# Patient Record
Sex: MALE | Race: WHITE | Employment: OTHER | URBAN - METROPOLITAN AREA
[De-identification: names, ages, dates, MRNs, and addresses within clinical notes are randomized per-mention and may not be internally consistent; named-entity substitution may affect disease eponyms.]

---

## 2018-04-25 ENCOUNTER — HOSPITAL ENCOUNTER (OUTPATIENT)
Dept: NON INVASIVE DIAGNOSTICS | Age: 78
Discharge: HOME | End: 2018-04-25
Payer: MEDICARE

## 2018-04-25 LAB
LV EF: 55 %
LVEF MODALITY: NORMAL

## 2018-04-25 PROCEDURE — 93306 TTE W/DOPPLER COMPLETE: CPT

## 2018-08-01 ENCOUNTER — APPOINTMENT (OUTPATIENT)
Dept: GENERAL RADIOLOGY | Age: 78
End: 2018-08-01

## 2018-08-01 ENCOUNTER — HOSPITAL ENCOUNTER (EMERGENCY)
Age: 78
Discharge: HOME | End: 2018-08-01
Attending: EMERGENCY MEDICINE

## 2018-08-01 VITALS
OXYGEN SATURATION: 97 % | HEART RATE: 96 BPM | TEMPERATURE: 97.5 F | BODY MASS INDEX: 19.99 KG/M2 | RESPIRATION RATE: 19 BRPM | WEIGHT: 135 LBS | HEIGHT: 69 IN | SYSTOLIC BLOOD PRESSURE: 107 MMHG | DIASTOLIC BLOOD PRESSURE: 66 MMHG

## 2018-08-01 DIAGNOSIS — S30.0XXA TRAUMATIC HEMATOMA OF BUTTOCK, INITIAL ENCOUNTER: Primary | ICD-10-CM

## 2018-08-01 PROCEDURE — 72170 X-RAY EXAM OF PELVIS: CPT

## 2018-08-01 PROCEDURE — 6370000000 HC RX 637 (ALT 250 FOR IP): Performed by: EMERGENCY MEDICINE

## 2018-08-01 PROCEDURE — 99284 EMERGENCY DEPT VISIT MOD MDM: CPT

## 2018-08-01 RX ORDER — ESOMEPRAZOLE MAGNESIUM 40 MG/1
40 FOR SUSPENSION ORAL
COMMUNITY
Start: 2018-07-23

## 2018-08-01 RX ORDER — IBUPROFEN 800 MG/1
800 TABLET ORAL ONCE
Status: COMPLETED | OUTPATIENT
Start: 2018-08-01 | End: 2018-08-01

## 2018-08-01 RX ORDER — IBUPROFEN 600 MG/1
600 TABLET ORAL EVERY 6 HOURS PRN
Qty: 30 TABLET | Refills: 0 | Status: SHIPPED | OUTPATIENT
Start: 2018-08-01

## 2018-08-01 RX ORDER — CETIRIZINE HYDROCHLORIDE 10 MG/1
1 TABLET ORAL
COMMUNITY
Start: 2016-10-03

## 2018-08-01 RX ORDER — ASPIRIN 81 MG/1
81 TABLET, CHEWABLE ORAL
COMMUNITY
Start: 2018-07-23

## 2018-08-01 RX ORDER — ACETAMINOPHEN 325 MG/1
325 TABLET ORAL EVERY 6 HOURS PRN
Qty: 60 TABLET | Refills: 0 | Status: SHIPPED | OUTPATIENT
Start: 2018-08-01

## 2018-08-01 RX ORDER — TRAZODONE HYDROCHLORIDE 100 MG/1
TABLET ORAL
COMMUNITY
Start: 2017-08-10

## 2018-08-01 RX ORDER — ALBUTEROL SULFATE 90 UG/1
2 AEROSOL, METERED RESPIRATORY (INHALATION)
COMMUNITY

## 2018-08-01 RX ORDER — FLUTICASONE PROPIONATE 50 MCG
SPRAY, SUSPENSION (ML) NASAL
COMMUNITY
Start: 2017-07-10

## 2018-08-01 RX ORDER — ACETAMINOPHEN 500 MG
1000 TABLET ORAL ONCE
Status: COMPLETED | OUTPATIENT
Start: 2018-08-01 | End: 2018-08-01

## 2018-08-01 RX ORDER — ROSUVASTATIN CALCIUM 5 MG/1
5 TABLET, COATED ORAL
COMMUNITY
Start: 2017-07-10

## 2018-08-01 RX ADMIN — ACETAMINOPHEN 1000 MG: 500 TABLET ORAL at 15:32

## 2018-08-01 RX ADMIN — IBUPROFEN 800 MG: 800 TABLET, FILM COATED ORAL at 15:32

## 2018-08-01 ASSESSMENT — ENCOUNTER SYMPTOMS
ABDOMINAL PAIN: 0
SHORTNESS OF BREATH: 0

## 2018-08-01 ASSESSMENT — PAIN DESCRIPTION - FREQUENCY: FREQUENCY: CONTINUOUS

## 2018-08-01 ASSESSMENT — PAIN DESCRIPTION - LOCATION: LOCATION: HIP

## 2018-08-01 ASSESSMENT — PAIN DESCRIPTION - PAIN TYPE: TYPE: ACUTE PAIN

## 2018-08-01 ASSESSMENT — PAIN SCALES - GENERAL
PAINLEVEL_OUTOF10: 6
PAINLEVEL_OUTOF10: 6

## 2018-08-01 ASSESSMENT — PAIN DESCRIPTION - PROGRESSION: CLINICAL_PROGRESSION: GRADUALLY WORSENING

## 2018-08-01 ASSESSMENT — PAIN DESCRIPTION - ORIENTATION: ORIENTATION: RIGHT

## 2018-08-01 ASSESSMENT — PAIN DESCRIPTION - DESCRIPTORS: DESCRIPTORS: DISCOMFORT

## 2018-08-01 ASSESSMENT — PAIN DESCRIPTION - ONSET: ONSET: SUDDEN

## 2018-08-01 NOTE — ED NOTES
Valentina Andersen is a 68 y/o male who presents to the emergency department s/p mechanical fall with right gluteal pain that does not radiate and is not made worse with palpation or movement. Patient states that he was walking on flat ground when he fell backwards. Patient does not claim to have had palpitations, light headedness, dizziness, or any change in subjective feeling before the fall. Patient states that he did not lose consciousness and did not hit his head. Patient does not claim that he has pain in any other location. ROS is negative for chest pain, shortness of breath, light headedness, lower extremity weakness or pain. Objective:   Constitutional: patient is lying comfortably on exam table   Head: no lac, bruises, or bumps noted   CV: Normal rate and rhythm  Pulm: Breath sounds equal bilaterally   Musculoskeletal: Patient has 5/5 strength in lower and upper extremities   Derm: Patient is noted to have a hematoma in the right gluteal area.

## 2018-08-01 NOTE — ED PROVIDER NOTES
[facility] ED  Emergency Department Encounter  Emergency Medicine Resident     Pt Name: Dasha Mcarthur  MRN: [id number]  [place] 1940  Date of evaluation: 8/1/18  PCP:  No primary care provider on file. CHIEF COMPLAINT       Chief Complaint   Patient presents with    Fall     pt to ED c/o right hip pain s/p fall this morning. pt denies loc or hitting his head. pt a&o x4, answering questions appropriately. HISTORY OF PRESENT ILLNESS  (Location/Symptom, Timing/Onset, Context/Setting, Quality, Duration, Modifying Factors, Severity, Associated signs/symptoms)     Davy Floyd is a 68 y/o male who presents to the emergency department s/p mechanical fall with right gluteal pain that does not radiate and is not made worse with palpation or movement. Patient states that he was walking on flat ground when he fell backwards. Patient does not claim to have had palpitations, light headedness, dizziness, or any change in subjective feeling before the fall. Patient states that he did not lose consciousness and did not hit his head. Patient does not claim that he has pain in any other location. Is not on any blood thinners daily. Denies any pain elsewhere. Denies any changes in his gait. Denies any focal neurological deficits. (History was documented by medical student and reviewed by resident who is agreeable. Modifications and/or additions may have been made by resident appropriately.)    PAST MEDICAL / SURGICAL / SOCIAL / FAMILY HISTORY      has no past medical history on file. has no past surgical history on file. Social History     Social History    Marital status: Single     Spouse name: N/A    Number of children: N/A    Years of education: N/A     Occupational History    Not on file.      Social History Main Topics    Smoking status: Not on file    Smokeless tobacco: Not on file    Alcohol use Not on file    Drug use: Unknown    Sexual activity: Not on file     Other Topics Concern    Not on file     Social History Narrative    No narrative on file       History reviewed. No pertinent family history. Allergies:  Patient has no known allergies. Home Medications:  Prior to Admission medications    Medication Sig Start Date End Date Taking? Authorizing Provider   aspirin 81 MG chewable tablet Take 81 mg by mouth 7/23/18  Yes Historical Provider, MD   cetirizine (ZYRTEC) 10 MG tablet Take 1 tablet by mouth 10/3/16  Yes Historical Provider, MD   esomeprazole Magnesium (NEXIUM) 40 MG PACK Take 40 mg by mouth 7/23/18  Yes Historical Provider, MD   traZODone (DESYREL) 100 MG tablet TAKE 1 TABLET BY MOUTH AT BEDTIME 8/10/17  Yes Historical Provider, MD   rosuvastatin (CRESTOR) 5 MG tablet Take 5 mg by mouth 7/10/17  Yes Historical Provider, MD   fluticasone (FLONASE) 50 MCG/ACT nasal spray ADMINISTER 2 SPRAYS INTO EACH NOSTRIL DAILY. 7/10/17  Yes Historical Provider, MD   acetaminophen (TYLENOL) 325 MG tablet Take 1 tablet by mouth every 6 hours as needed for Pain 8/1/18  Yes Rachel Brewster MD   ibuprofen (ADVIL;MOTRIN) 600 MG tablet Take 1 tablet by mouth every 6 hours as needed for Pain 8/1/18  Yes Rachel Brewster MD   albuterol sulfate  (90 Base) MCG/ACT inhaler Inhale 2 puffs into the lungs    Historical Provider, MD       REVIEW OF SYSTEMS    (2-9 systems for level 4, 10 or more for level 5)      Review of Systems   Constitutional: Negative for chills and fever. Eyes: Negative for visual disturbance. Respiratory: Negative for shortness of breath. Cardiovascular: Negative for chest pain. Gastrointestinal: Negative for abdominal pain. Musculoskeletal: Negative for arthralgias. +right buttock pain   Skin: Negative for wound. Neurological: Negative for dizziness, light-headedness and headaches.      PHYSICAL EXAM   (up to 7 for level 4, 8 or more for level 5)      INITIAL VITALS:   /66   Pulse 96   Temp 97.5 °F (36.4 °C) (Oral)   Resp 19

## 2020-07-15 ENCOUNTER — APPOINTMENT (OUTPATIENT)
Dept: GENERAL RADIOLOGY | Age: 80
End: 2020-07-15
Payer: MEDICARE

## 2020-07-15 ENCOUNTER — HOSPITAL ENCOUNTER (EMERGENCY)
Age: 80
Discharge: HOME | End: 2020-07-15
Attending: EMERGENCY MEDICINE
Payer: MEDICARE

## 2020-07-15 VITALS
TEMPERATURE: 98.6 F | OXYGEN SATURATION: 97 % | WEIGHT: 165 LBS | DIASTOLIC BLOOD PRESSURE: 73 MMHG | HEART RATE: 80 BPM | HEIGHT: 67 IN | RESPIRATION RATE: 16 BRPM | SYSTOLIC BLOOD PRESSURE: 123 MMHG | BODY MASS INDEX: 25.9 KG/M2

## 2020-07-15 PROCEDURE — 73521 X-RAY EXAM HIPS BI 2 VIEWS: CPT

## 2020-07-15 PROCEDURE — 99283 EMERGENCY DEPT VISIT LOW MDM: CPT

## 2020-07-15 PROCEDURE — 6370000000 HC RX 637 (ALT 250 FOR IP): Performed by: EMERGENCY MEDICINE

## 2020-07-15 RX ORDER — ACETAMINOPHEN 500 MG
1000 TABLET ORAL ONCE
Status: COMPLETED | OUTPATIENT
Start: 2020-07-15 | End: 2020-07-15

## 2020-07-15 RX ORDER — NAPROXEN 500 MG/1
500 TABLET ORAL 2 TIMES DAILY
Qty: 20 TABLET | Refills: 0 | Status: SHIPPED | OUTPATIENT
Start: 2020-07-15

## 2020-07-15 RX ADMIN — ACETAMINOPHEN 1000 MG: 500 TABLET ORAL at 12:01

## 2020-07-15 ASSESSMENT — ENCOUNTER SYMPTOMS
TROUBLE SWALLOWING: 0
BLOOD IN STOOL: 0
DIARRHEA: 0
SORE THROAT: 0
WHEEZING: 0
VOMITING: 0
NAUSEA: 0
COUGH: 0
VOICE CHANGE: 0
SHORTNESS OF BREATH: 0

## 2020-07-15 ASSESSMENT — PAIN SCALES - GENERAL
PAINLEVEL_OUTOF10: 6
PAINLEVEL_OUTOF10: 6

## 2020-07-15 ASSESSMENT — PAIN DESCRIPTION - PAIN TYPE: TYPE: ACUTE PAIN

## 2020-07-15 ASSESSMENT — PAIN DESCRIPTION - ORIENTATION: ORIENTATION: RIGHT

## 2020-07-15 ASSESSMENT — PAIN DESCRIPTION - FREQUENCY: FREQUENCY: CONTINUOUS

## 2020-07-15 ASSESSMENT — PAIN DESCRIPTION - DESCRIPTORS: DESCRIPTORS: SHOOTING

## 2020-07-15 ASSESSMENT — PAIN DESCRIPTION - LOCATION: LOCATION: HIP

## 2020-07-15 NOTE — ED NOTES
Patient to ed with right hip pain that does radiate down right leg. Patient states it started while walking this am. Patient denies hx of this pain. Patient denies injury. Patient denies hx of sciatica. Patient denies hx of hip problem. Patient denies taking anything for pain. Patient states he can bear some weight but it hurts very badly.       Mdadi Kaiser RN  07/15/20 8323

## 2020-07-15 NOTE — ED NOTES
Bed: 07  Expected date:   Expected time:   Means of arrival:   Comments:     Brisa Tinajero RN  07/15/20 1024

## 2020-07-15 NOTE — ED NOTES
SW set up patient's [address] with [employer]. Patient waiting in ED Deanne. Donavan Sotomayor.  Richard Hammans  07/15/20 4510

## 2020-07-15 NOTE — ED PROVIDER NOTES
[facility]  ED  eMERGENCY dEPARTMENT eNCOUnter      Pt Name: Bianka Bolaños  MRN: [id number]  [place] 1940  Date of evaluation: 7/15/2020  Provider: Nilo Carson MD    [address] [place] [address]     Chief Complaint   Patient presents with    Hip Pain     right hip pain, started when walking, shooting pain, does go down right leg, no hx of sciatica. no hx of this pain, no injury         HISTORY OF PRESENT ILLNESS   (Location/Symptom, Timing/Onset, Context/Setting,Quality, Duration, Modifying Factors, Severity)  Note limiting factors. Bianka Bolaños is a67 y.o. male who presents to the emergency department      HPI    80-year-old male states he was walking today when he developed sudden onset right hip pain. He states he did not fall however has had trouble walking since. He denies prior fractures, denies chronic hip pain. No other concerning symptoms. Denies fevers or chills, no other trauma. Nursing Notes werereviewed. REVIEW OF SYSTEMS    (2-9 systems for level 4, 10 or more for level 5)     Review of Systems   Constitutional: Negative for chills, fatigue and fever. HENT: Negative for sore throat, trouble swallowing and voice change. Eyes: Negative for visual disturbance. Respiratory: Negative for cough, shortness of breath and wheezing. Cardiovascular: Negative for chest pain. Gastrointestinal: Negative for blood in stool, diarrhea, nausea and vomiting. Genitourinary: Negative for difficulty urinating and flank pain. Skin: Negative for rash. Neurological: Negative for weakness and numbness. Psychiatric/Behavioral: Negative for agitation. Except as noted above the remainder of the review of systems was reviewed and negative. PAST MEDICAL HISTORY   History reviewed. No pertinent past medical history. SURGICALHISTORY     History reviewed. No pertinent surgical history.       CURRENT MEDICATIONS       Previous Medications    ACETAMINOPHEN (TYLENOL) 325 MG TABLET    Take 1 tablet by mouth every 6 hours as needed for Pain    ALBUTEROL SULFATE  (90 BASE) MCG/ACT INHALER    Inhale 2 puffs into the lungs    ASPIRIN 81 MG CHEWABLE TABLET    Take 81 mg by mouth    CETIRIZINE (ZYRTEC) 10 MG TABLET    Take 1 tablet by mouth    ESOMEPRAZOLE MAGNESIUM (NEXIUM) 40 MG PACK    Take 40 mg by mouth    FLUTICASONE (FLONASE) 50 MCG/ACT NASAL SPRAY    ADMINISTER 2 SPRAYS INTO EACH NOSTRIL DAILY. IBUPROFEN (ADVIL;MOTRIN) 600 MG TABLET    Take 1 tablet by mouth every 6 hours as needed for Pain    ROSUVASTATIN (CRESTOR) 5 MG TABLET    Take 5 mg by mouth    TRAZODONE (DESYREL) 100 MG TABLET    TAKE 1 TABLET BY MOUTH AT BEDTIME       ALLERGIES     Patient has no known allergies. FAMILY HISTORY     History reviewed. No pertinent family history.        SOCIAL HISTORY       Social History     Socioeconomic History    Marital status: Single     Spouse name: None    Number of children: None    Years of education: None    Highest education level: None   Occupational History    None   Social Needs    Financial resource strain: None    Food insecurity     Worry: None     Inability: None    Transportation needs     Medical: None     Non-medical: None   Tobacco Use    Smoking status: None   Substance and Sexual Activity    Alcohol use: None    Drug use: None    Sexual activity: None   Lifestyle    Physical activity     Days per week: None     Minutes per session: None    Stress: None   Relationships    Social connections     Talks on phone: None     Gets together: None     Attends [employer] service: None     Active member of club or organization: None     Attends meetings of clubs or organizations: None     Relationship status: None    Intimate partner violence     Fear of current or ex partner: None     Emotionally abused: None     Physically abused: None     Forced sexual activity: None   Other Topics Concern    None   Social History Narrative    None       SCREENINGS range or not returned as of this dictation. EMERGENCY DEPARTMENT COURSE and DIFFERENTIAL DIAGNOSIS/MDM:   Vitals:    Vitals:    07/15/20 1027   BP: 123/73   Pulse: 80   Resp: 16   Temp: 98.6 °F (37 °C)   TempSrc: Oral   SpO2: 97%   Weight: 165 lb (74.8 kg)   Height: 5' 7\" (1.702 m)         MDM  Number of Diagnoses or Management Options  Diagnosis management comments: 61-year-old with isolated right hip pain, states started today. Exam reassuring, patient is very hard of hearing however denies any other trauma or concerning symptoms. Give Tylenol, get x-ray of the pelvis and hip, reassess. 12:58 PM EDT  X-ray reassuring except for arthritis, patient is able to stand, stand on both legs individually and raise opposite leg up. He has no restriction of motion, no reproducible tenderness over the joint. He lives by himself however states he does not wish for a cane or any walking assistance. He has no issues walking has not fallen at home. We will discharge him with Naprosyn, PCP follow-up, and strict return precautions            Procedures    FINAL IMPRESSION      1. Right hip pain        DISPOSITION/PLAN   DISPOSITION Decision To Discharge 07/15/2020 01:00:19 PM      PATIENT REFERRED TO:  Dr Yenni Bowman  Your Primary Care Doctor  In 1 day  As needed, If symptoms worsen      DISCHARGE MEDICATIONS:  New Prescriptions    NAPROXEN (NAPROSYN) 500 MG TABLET    Take 1 tablet by mouth 2 times daily              Summation      Patient Course:      ED Medications administered this visit:    Medications   acetaminophen (TYLENOL) tablet 1,000 mg (1,000 mg Oral Given 7/15/20 1201)       New Prescriptions from this visit:    New Prescriptions    NAPROXEN (NAPROSYN) 500 MG TABLET    Take 1 tablet by mouth 2 times daily       Follow-up:  Dr Yenni Bowman  Your Primary Care Doctor  In 1 day  As needed, If symptoms worsen        Final Impression:   1.  Right hip pain               (Please note that portions of this note were completed with a voice recognition program.  Efforts were made to edit the dictations but occasionally words are mis-transcribed.)            Chayito Rock MD  07/15/20 2591

## 2020-07-15 NOTE — FLOWSHEET NOTE
Assessment: Patient was awake when  visited. Family was not present. Patient was a bit passive but open to spiritual care. When asked how he was feeling, patient smiled. Intervention:  offered support and prayed with patient. Outcome: Patient thanked  for praying. Follow up visits recommended for more prayers and support.        07/15/20 1242   Encounter Summary   Services provided to: Patient   Continue Visiting   (07/15/2020)   Complexity of Encounter Moderate   Length of Encounter 15 minutes   Spiritual Assessment Completed Yes   Routine   Type Initial   Assessment Approachable;Passive   Intervention Active listening;Prayer;[place];Empowerment   Outcome Expressed gratitude   Spiritual/Temple   Type Spiritual support

## 2020-07-16 ENCOUNTER — HOSPITAL ENCOUNTER (OUTPATIENT)
Age: 80
Setting detail: SPECIMEN
Discharge: HOME | End: 2020-07-16
Payer: MEDICARE

## 2020-07-22 LAB — SARS-COV-2, NAA: NOT DETECTED

## 2020-07-23 ENCOUNTER — TELEPHONE (OUTPATIENT)
Dept: FAMILY MEDICINE CLINIC | Age: 80
End: 2020-07-23

## 2021-01-01 ENCOUNTER — APPOINTMENT (OUTPATIENT)
Dept: GENERAL RADIOLOGY | Age: 81
DRG: 871 | End: 2021-01-01
Payer: MEDICARE

## 2021-01-01 ENCOUNTER — APPOINTMENT (OUTPATIENT)
Dept: CT IMAGING | Age: 81
DRG: 871 | End: 2021-01-01
Payer: MEDICARE

## 2021-01-01 ENCOUNTER — HOSPITAL ENCOUNTER (INPATIENT)
Age: 81
LOS: 1 days | DRG: 871 | End: 2021-11-27
Attending: EMERGENCY MEDICINE | Admitting: EMERGENCY MEDICINE
Payer: MEDICARE

## 2021-01-01 VITALS
TEMPERATURE: 91 F | HEART RATE: 67 BPM | SYSTOLIC BLOOD PRESSURE: 58 MMHG | OXYGEN SATURATION: 96 % | RESPIRATION RATE: 23 BRPM | DIASTOLIC BLOOD PRESSURE: 36 MMHG

## 2021-01-01 DIAGNOSIS — U07.1 COVID: Primary | ICD-10-CM

## 2021-01-01 DIAGNOSIS — U07.1 ACUTE RESPIRATORY FAILURE DUE TO COVID-19 (HCC): ICD-10-CM

## 2021-01-01 DIAGNOSIS — A41.9 SEPTIC SHOCK (HCC): ICD-10-CM

## 2021-01-01 DIAGNOSIS — J96.00 ACUTE RESPIRATORY FAILURE DUE TO COVID-19 (HCC): ICD-10-CM

## 2021-01-01 DIAGNOSIS — M62.82 NON-TRAUMATIC RHABDOMYOLYSIS: ICD-10-CM

## 2021-01-01 DIAGNOSIS — I46.9 CARDIAC ARREST (HCC): ICD-10-CM

## 2021-01-01 DIAGNOSIS — U07.1 SEPSIS DUE TO COVID-19 (HCC): ICD-10-CM

## 2021-01-01 DIAGNOSIS — R65.21 SEPTIC SHOCK (HCC): ICD-10-CM

## 2021-01-01 DIAGNOSIS — A41.89 SEPSIS DUE TO COVID-19 (HCC): ICD-10-CM

## 2021-01-01 LAB
ABSOLUTE EOS #: 0 K/UL (ref 0–0.4)
ABSOLUTE EOS #: 0 K/UL (ref 0–0.4)
ABSOLUTE IMMATURE GRANULOCYTE: 0 K/UL (ref 0–0.3)
ABSOLUTE IMMATURE GRANULOCYTE: 0.62 K/UL (ref 0–0.3)
ABSOLUTE LYMPH #: 0.4 K/UL (ref 1–4.8)
ABSOLUTE LYMPH #: 0.62 K/UL (ref 1–4.8)
ABSOLUTE MONO #: 0.6 K/UL (ref 0.1–0.8)
ABSOLUTE MONO #: 1.24 K/UL (ref 0.1–0.8)
ACETAMINOPHEN LEVEL: <5 UG/ML (ref 10–30)
ALBUMIN SERPL-MCNC: 3.1 G/DL (ref 3.5–5.2)
ALBUMIN/GLOBULIN RATIO: 0.9 (ref 1–2.5)
ALLEN TEST: ABNORMAL
ALP BLD-CCNC: 113 U/L (ref 40–129)
ALT SERPL-CCNC: 57 U/L (ref 5–41)
AMMONIA: 22 UMOL/L (ref 16–60)
ANION GAP SERPL CALCULATED.3IONS-SCNC: 23 MMOL/L (ref 9–17)
ANION GAP SERPL CALCULATED.3IONS-SCNC: 28 MMOL/L (ref 9–17)
ANION GAP: 4 MMOL/L (ref 7–16)
AST SERPL-CCNC: 103 U/L
BASOPHILS # BLD: 0 % (ref 0–2)
BASOPHILS # BLD: 0 % (ref 0–2)
BASOPHILS ABSOLUTE: 0 K/UL (ref 0–0.2)
BASOPHILS ABSOLUTE: 0 K/UL (ref 0–0.2)
BILIRUB SERPL-MCNC: 3.95 MG/DL (ref 0.3–1.2)
BUN BLDV-MCNC: 86 MG/DL (ref 8–23)
BUN BLDV-MCNC: 88 MG/DL (ref 8–23)
BUN/CREAT BLD: ABNORMAL (ref 9–20)
BUN/CREAT BLD: ABNORMAL (ref 9–20)
CALCIUM SERPL-MCNC: 10.6 MG/DL (ref 8.6–10.4)
CALCIUM SERPL-MCNC: 8.9 MG/DL (ref 8.6–10.4)
CHLORIDE BLD-SCNC: 110 MMOL/L (ref 98–107)
CHLORIDE BLD-SCNC: 113 MMOL/L (ref 98–107)
CO2: 13 MMOL/L (ref 20–31)
CO2: 9 MMOL/L (ref 20–31)
CREAT SERPL-MCNC: 3.83 MG/DL (ref 0.7–1.2)
CREAT SERPL-MCNC: 3.9 MG/DL (ref 0.7–1.2)
DIFFERENTIAL TYPE: ABNORMAL
DIFFERENTIAL TYPE: ABNORMAL
EOSINOPHILS RELATIVE PERCENT: 0 % (ref 1–4)
EOSINOPHILS RELATIVE PERCENT: 0 % (ref 1–4)
ETHANOL PERCENT: <0.01 %
ETHANOL: <10 MG/DL
FIO2: ABNORMAL
GFR AFRICAN AMERICAN: 18 ML/MIN
GFR AFRICAN AMERICAN: 19 ML/MIN
GFR NON-AFRICAN AMERICAN: 11 ML/MIN
GFR NON-AFRICAN AMERICAN: 15 ML/MIN
GFR NON-AFRICAN AMERICAN: 15 ML/MIN
GFR SERPL CREATININE-BSD FRML MDRD: 13 ML/MIN
GFR SERPL CREATININE-BSD FRML MDRD: ABNORMAL ML/MIN/{1.73_M2}
GLUCOSE BLD-MCNC: 115 MG/DL (ref 74–100)
GLUCOSE BLD-MCNC: 138 MG/DL (ref 70–99)
GLUCOSE BLD-MCNC: 96 MG/DL (ref 70–99)
HCO3 VENOUS: 15 MMOL/L (ref 22–29)
HCT VFR BLD CALC: 50.4 % (ref 40.7–50.3)
HCT VFR BLD CALC: 58.3 % (ref 40.7–50.3)
HEMOGLOBIN: 16.1 G/DL (ref 13–17)
HEMOGLOBIN: 19.6 G/DL (ref 13–17)
IMMATURE GRANULOCYTES: 0 %
IMMATURE GRANULOCYTES: 3 %
LACTIC ACID, SEPSIS WHOLE BLOOD: 8.5 MMOL/L (ref 0.5–1.9)
LACTIC ACID, SEPSIS WHOLE BLOOD: 9.3 MMOL/L (ref 0.5–1.9)
LACTIC ACID, SEPSIS: ABNORMAL MMOL/L (ref 0.5–1.9)
LACTIC ACID, SEPSIS: ABNORMAL MMOL/L (ref 0.5–1.9)
LIPASE: 38 U/L (ref 13–60)
LYMPHOCYTES # BLD: 2 % (ref 24–44)
LYMPHOCYTES # BLD: 3 % (ref 24–44)
MAGNESIUM: 2.9 MG/DL (ref 1.6–2.6)
MAGNESIUM: 3.2 MG/DL (ref 1.6–2.6)
MCH RBC QN AUTO: 31.5 PG (ref 25.2–33.5)
MCH RBC QN AUTO: 32.1 PG (ref 25.2–33.5)
MCHC RBC AUTO-ENTMCNC: 31.9 G/DL (ref 28.4–34.8)
MCHC RBC AUTO-ENTMCNC: 33.6 G/DL (ref 28.4–34.8)
MCV RBC AUTO: 95.4 FL (ref 82.6–102.9)
MCV RBC AUTO: 98.6 FL (ref 82.6–102.9)
MODE: ABNORMAL
MONOCYTES # BLD: 3 % (ref 1–7)
MONOCYTES # BLD: 6 % (ref 1–7)
MORPHOLOGY: NORMAL
MORPHOLOGY: NORMAL
MYOGLOBIN: 3010 NG/ML (ref 28–72)
NEGATIVE BASE EXCESS, VEN: 16 (ref 0–2)
NRBC AUTOMATED: 0.4 PER 100 WBC
NRBC AUTOMATED: 0.8 PER 100 WBC
NUCLEATED RED BLOOD CELLS: 1 PER 100 WBC
O2 DEVICE/FLOW/%: ABNORMAL
O2 SAT, VEN: 25 % (ref 60–85)
PATIENT TEMP: ABNORMAL
PCO2, VEN: 51.4 MM HG (ref 41–51)
PDW BLD-RTO: 13.5 % (ref 11.8–14.4)
PDW BLD-RTO: 13.6 % (ref 11.8–14.4)
PH VENOUS: 7.07 (ref 7.32–7.43)
PLATELET # BLD: ABNORMAL K/UL (ref 138–453)
PLATELET # BLD: ABNORMAL K/UL (ref 138–453)
PLATELET ESTIMATE: ABNORMAL
PLATELET ESTIMATE: ABNORMAL
PLATELET, FLUORESCENCE: 40 K/UL (ref 138–453)
PLATELET, FLUORESCENCE: 55 K/UL (ref 138–453)
PLATELET, IMMATURE FRACTION: 11.8 % (ref 1.1–10.3)
PLATELET, IMMATURE FRACTION: 13.7 % (ref 1.1–10.3)
PMV BLD AUTO: ABNORMAL FL (ref 8.1–13.5)
PMV BLD AUTO: ABNORMAL FL (ref 8.1–13.5)
PO2, VEN: 24.2 MM HG (ref 30–50)
POC BUN: 107 MG/DL (ref 8–26)
POC CHLORIDE: 128 MMOL/L (ref 98–107)
POC CREATININE: 5.08 MG/DL (ref 0.51–1.19)
POC HEMATOCRIT: 59 % (ref 41–53)
POC HEMOGLOBIN: 20.1 G/DL (ref 13.5–17.5)
POC IONIZED CALCIUM: 1.02 MMOL/L (ref 1.15–1.33)
POC LACTIC ACID: 10.45 MMOL/L (ref 0.56–1.39)
POC PCO2 TEMP: ABNORMAL MM HG
POC PH TEMP: ABNORMAL
POC PO2 TEMP: ABNORMAL MM HG
POC POTASSIUM: 8 MMOL/L (ref 3.5–5.1)
POC SODIUM: 148 MMOL/L (ref 138–146)
POC TCO2: 17 MMOL/L (ref 22–30)
POSITIVE BASE EXCESS, VEN: ABNORMAL (ref 0–3)
POTASSIUM SERPL-SCNC: 4.4 MMOL/L (ref 3.7–5.3)
POTASSIUM SERPL-SCNC: 5.3 MMOL/L (ref 3.7–5.3)
RBC # BLD: 5.11 M/UL (ref 4.21–5.77)
RBC # BLD: 6.11 M/UL (ref 4.21–5.77)
RBC # BLD: ABNORMAL 10*6/UL
RBC # BLD: ABNORMAL 10*6/UL
SALICYLATE LEVEL: <1 MG/DL (ref 3–10)
SAMPLE SITE: ABNORMAL
SARS-COV-2, RAPID: DETECTED
SEG NEUTROPHILS: 88 % (ref 36–66)
SEG NEUTROPHILS: 95 % (ref 36–66)
SEGMENTED NEUTROPHILS ABSOLUTE COUNT: 18.12 K/UL (ref 1.8–7.7)
SEGMENTED NEUTROPHILS ABSOLUTE COUNT: 19 K/UL (ref 1.8–7.7)
SODIUM BLD-SCNC: 147 MMOL/L (ref 135–144)
SODIUM BLD-SCNC: 149 MMOL/L (ref 135–144)
SPECIMEN DESCRIPTION: ABNORMAL
TOTAL CK: 984 U/L (ref 39–308)
TOTAL CO2, VENOUS: ABNORMAL MMOL/L (ref 23–30)
TOTAL PROTEIN: 6.4 G/DL (ref 6.4–8.3)
TOXIC TRICYCLIC SC,BLOOD: NEGATIVE
TROPONIN INTERP: ABNORMAL
TROPONIN T: ABNORMAL NG/ML
TROPONIN, HIGH SENSITIVITY: 60 NG/L (ref 0–22)
TSH SERPL DL<=0.05 MIU/L-ACNC: 1.17 MIU/L (ref 0.3–5)
WBC # BLD: 20 K/UL (ref 3.5–11.3)
WBC # BLD: 20.6 K/UL (ref 3.5–11.3)
WBC # BLD: ABNORMAL 10*3/UL
WBC # BLD: ABNORMAL 10*3/UL

## 2021-01-01 PROCEDURE — 83690 ASSAY OF LIPASE: CPT

## 2021-01-01 PROCEDURE — 80179 DRUG ASSAY SALICYLATE: CPT

## 2021-01-01 PROCEDURE — 06HY33Z INSERTION OF INFUSION DEVICE INTO LOWER VEIN, PERCUTANEOUS APPROACH: ICD-10-PCS | Performed by: EMERGENCY MEDICINE

## 2021-01-01 PROCEDURE — 82140 ASSAY OF AMMONIA: CPT

## 2021-01-01 PROCEDURE — 1200000000 HC SEMI PRIVATE

## 2021-01-01 PROCEDURE — 6360000002 HC RX W HCPCS: Performed by: STUDENT IN AN ORGANIZED HEALTH CARE EDUCATION/TRAINING PROGRAM

## 2021-01-01 PROCEDURE — 2580000003 HC RX 258: Performed by: EMERGENCY MEDICINE

## 2021-01-01 PROCEDURE — 6360000002 HC RX W HCPCS

## 2021-01-01 PROCEDURE — 6370000000 HC RX 637 (ALT 250 FOR IP): Performed by: STUDENT IN AN ORGANIZED HEALTH CARE EDUCATION/TRAINING PROGRAM

## 2021-01-01 PROCEDURE — G0480 DRUG TEST DEF 1-7 CLASSES: HCPCS

## 2021-01-01 PROCEDURE — 84443 ASSAY THYROID STIM HORMONE: CPT

## 2021-01-01 PROCEDURE — 96365 THER/PROPH/DIAG IV INF INIT: CPT

## 2021-01-01 PROCEDURE — 83874 ASSAY OF MYOGLOBIN: CPT

## 2021-01-01 PROCEDURE — 83605 ASSAY OF LACTIC ACID: CPT

## 2021-01-01 PROCEDURE — 85055 RETICULATED PLATELET ASSAY: CPT

## 2021-01-01 PROCEDURE — 2500000003 HC RX 250 WO HCPCS

## 2021-01-01 PROCEDURE — 6360000002 HC RX W HCPCS: Performed by: EMERGENCY MEDICINE

## 2021-01-01 PROCEDURE — 80143 DRUG ASSAY ACETAMINOPHEN: CPT

## 2021-01-01 PROCEDURE — 31500 INSERT EMERGENCY AIRWAY: CPT

## 2021-01-01 PROCEDURE — 99285 EMERGENCY DEPT VISIT HI MDM: CPT

## 2021-01-01 PROCEDURE — 84520 ASSAY OF UREA NITROGEN: CPT

## 2021-01-01 PROCEDURE — 71045 X-RAY EXAM CHEST 1 VIEW: CPT

## 2021-01-01 PROCEDURE — 84484 ASSAY OF TROPONIN QUANT: CPT

## 2021-01-01 PROCEDURE — 82565 ASSAY OF CREATININE: CPT

## 2021-01-01 PROCEDURE — 87040 BLOOD CULTURE FOR BACTERIA: CPT

## 2021-01-01 PROCEDURE — 82330 ASSAY OF CALCIUM: CPT

## 2021-01-01 PROCEDURE — 80053 COMPREHEN METABOLIC PANEL: CPT

## 2021-01-01 PROCEDURE — 5A12012 PERFORMANCE OF CARDIAC OUTPUT, SINGLE, MANUAL: ICD-10-PCS | Performed by: EMERGENCY MEDICINE

## 2021-01-01 PROCEDURE — 85025 COMPLETE CBC W/AUTO DIFF WBC: CPT

## 2021-01-01 PROCEDURE — 85014 HEMATOCRIT: CPT

## 2021-01-01 PROCEDURE — 83735 ASSAY OF MAGNESIUM: CPT

## 2021-01-01 PROCEDURE — 80051 ELECTROLYTE PANEL: CPT

## 2021-01-01 PROCEDURE — 93005 ELECTROCARDIOGRAM TRACING: CPT | Performed by: STUDENT IN AN ORGANIZED HEALTH CARE EDUCATION/TRAINING PROGRAM

## 2021-01-01 PROCEDURE — 5A1935Z RESPIRATORY VENTILATION, LESS THAN 24 CONSECUTIVE HOURS: ICD-10-PCS | Performed by: EMERGENCY MEDICINE

## 2021-01-01 PROCEDURE — 80048 BASIC METABOLIC PNL TOTAL CA: CPT

## 2021-01-01 PROCEDURE — 04HY32Z INSERTION OF MONITORING DEVICE INTO LOWER ARTERY, PERCUTANEOUS APPROACH: ICD-10-PCS | Performed by: EMERGENCY MEDICINE

## 2021-01-01 PROCEDURE — 72125 CT NECK SPINE W/O DYE: CPT

## 2021-01-01 PROCEDURE — 2500000003 HC RX 250 WO HCPCS: Performed by: STUDENT IN AN ORGANIZED HEALTH CARE EDUCATION/TRAINING PROGRAM

## 2021-01-01 PROCEDURE — 70450 CT HEAD/BRAIN W/O DYE: CPT

## 2021-01-01 PROCEDURE — 2580000003 HC RX 258: Performed by: STUDENT IN AN ORGANIZED HEALTH CARE EDUCATION/TRAINING PROGRAM

## 2021-01-01 PROCEDURE — 92950 HEART/LUNG RESUSCITATION CPR: CPT

## 2021-01-01 PROCEDURE — 82550 ASSAY OF CK (CPK): CPT

## 2021-01-01 PROCEDURE — 96375 TX/PRO/DX INJ NEW DRUG ADDON: CPT

## 2021-01-01 PROCEDURE — 87635 SARS-COV-2 COVID-19 AMP PRB: CPT

## 2021-01-01 PROCEDURE — 96361 HYDRATE IV INFUSION ADD-ON: CPT

## 2021-01-01 PROCEDURE — 82947 ASSAY GLUCOSE BLOOD QUANT: CPT

## 2021-01-01 PROCEDURE — 2500000003 HC RX 250 WO HCPCS: Performed by: EMERGENCY MEDICINE

## 2021-01-01 PROCEDURE — 82803 BLOOD GASES ANY COMBINATION: CPT

## 2021-01-01 PROCEDURE — 93010 ELECTROCARDIOGRAM REPORT: CPT | Performed by: INTERNAL MEDICINE

## 2021-01-01 PROCEDURE — 80307 DRUG TEST PRSMV CHEM ANLYZR: CPT

## 2021-01-01 RX ORDER — SODIUM CHLORIDE 0.9 % (FLUSH) 0.9 %
5-40 SYRINGE (ML) INJECTION PRN
Status: CANCELLED | OUTPATIENT
Start: 2021-01-01

## 2021-01-01 RX ORDER — ONDANSETRON 4 MG/1
4 TABLET, ORALLY DISINTEGRATING ORAL EVERY 8 HOURS PRN
Status: CANCELLED | OUTPATIENT
Start: 2021-01-01

## 2021-01-01 RX ORDER — CALCIUM GLUCONATE 20 MG/ML
1000 INJECTION, SOLUTION INTRAVENOUS ONCE
Status: COMPLETED | OUTPATIENT
Start: 2021-01-01 | End: 2021-01-01

## 2021-01-01 RX ORDER — CALCIUM GLUCONATE 20 MG/ML
1000 INJECTION, SOLUTION INTRAVENOUS ONCE
Status: DISCONTINUED | OUTPATIENT
Start: 2021-01-01 | End: 2021-01-01

## 2021-01-01 RX ORDER — DEXTROSE MONOHYDRATE 25 G/50ML
25 INJECTION, SOLUTION INTRAVENOUS ONCE
Status: COMPLETED | OUTPATIENT
Start: 2021-01-01 | End: 2021-01-01

## 2021-01-01 RX ORDER — ROSUVASTATIN CALCIUM 5 MG/1
5 TABLET, COATED ORAL NIGHTLY
Status: CANCELLED | OUTPATIENT
Start: 2021-01-01

## 2021-01-01 RX ORDER — NALOXONE HYDROCHLORIDE 1 MG/ML
2 INJECTION INTRAMUSCULAR; INTRAVENOUS; SUBCUTANEOUS ONCE
Status: COMPLETED | OUTPATIENT
Start: 2021-01-01 | End: 2021-01-01

## 2021-01-01 RX ORDER — POLYETHYLENE GLYCOL 3350 17 G/17G
17 POWDER, FOR SOLUTION ORAL DAILY PRN
Status: CANCELLED | OUTPATIENT
Start: 2021-01-01

## 2021-01-01 RX ORDER — 0.9 % SODIUM CHLORIDE 0.9 %
1000 INTRAVENOUS SOLUTION INTRAVENOUS ONCE
Status: COMPLETED | OUTPATIENT
Start: 2021-01-01 | End: 2021-01-01

## 2021-01-01 RX ORDER — ALBUTEROL SULFATE 90 UG/1
2 AEROSOL, METERED RESPIRATORY (INHALATION) 4 TIMES DAILY
Status: CANCELLED | OUTPATIENT
Start: 2021-01-01

## 2021-01-01 RX ORDER — ASPIRIN 81 MG/1
81 TABLET, CHEWABLE ORAL DAILY
Status: CANCELLED | OUTPATIENT
Start: 2021-01-01

## 2021-01-01 RX ORDER — NICOTINE POLACRILEX 4 MG
15 LOZENGE BUCCAL PRN
Status: DISCONTINUED | OUTPATIENT
Start: 2021-01-01 | End: 2021-01-01 | Stop reason: HOSPADM

## 2021-01-01 RX ORDER — SENNOSIDES 8.8 MG/5ML
5 LIQUID ORAL NIGHTLY
Status: CANCELLED | OUTPATIENT
Start: 2021-01-01

## 2021-01-01 RX ORDER — ONDANSETRON 2 MG/ML
4 INJECTION INTRAMUSCULAR; INTRAVENOUS EVERY 6 HOURS PRN
Status: CANCELLED | OUTPATIENT
Start: 2021-01-01

## 2021-01-01 RX ORDER — SODIUM CHLORIDE 0.9 % (FLUSH) 0.9 %
5-40 SYRINGE (ML) INJECTION EVERY 12 HOURS SCHEDULED
Status: CANCELLED | OUTPATIENT
Start: 2021-01-01

## 2021-01-01 RX ORDER — LIDOCAINE HYDROCHLORIDE ANHYDROUS AND DEXTROSE MONOHYDRATE .4; 5 G/100ML; G/100ML
2 INJECTION, SOLUTION INTRAVENOUS CONTINUOUS
Status: DISCONTINUED | OUTPATIENT
Start: 2021-01-01 | End: 2021-01-01 | Stop reason: HOSPADM

## 2021-01-01 RX ORDER — 0.9 % SODIUM CHLORIDE 0.9 %
2000 INTRAVENOUS SOLUTION INTRAVENOUS ONCE
Status: COMPLETED | OUTPATIENT
Start: 2021-01-01 | End: 2021-01-01

## 2021-01-01 RX ORDER — DEXTROSE MONOHYDRATE 25 G/50ML
12.5 INJECTION, SOLUTION INTRAVENOUS PRN
Status: DISCONTINUED | OUTPATIENT
Start: 2021-01-01 | End: 2021-01-01 | Stop reason: HOSPADM

## 2021-01-01 RX ORDER — ACETAMINOPHEN 650 MG/1
650 SUPPOSITORY RECTAL EVERY 6 HOURS PRN
Status: CANCELLED | OUTPATIENT
Start: 2021-01-01

## 2021-01-01 RX ORDER — ACETAMINOPHEN 325 MG/1
650 TABLET ORAL EVERY 6 HOURS PRN
Status: CANCELLED | OUTPATIENT
Start: 2021-01-01

## 2021-01-01 RX ORDER — DEXTROSE MONOHYDRATE 50 MG/ML
100 INJECTION, SOLUTION INTRAVENOUS PRN
Status: DISCONTINUED | OUTPATIENT
Start: 2021-01-01 | End: 2021-01-01 | Stop reason: HOSPADM

## 2021-01-01 RX ORDER — NOREPINEPHRINE BIT/0.9 % NACL 16MG/250ML
INFUSION BOTTLE (ML) INTRAVENOUS
Status: COMPLETED
Start: 2021-01-01 | End: 2021-01-01

## 2021-01-01 RX ORDER — SODIUM CHLORIDE 9 MG/ML
25 INJECTION, SOLUTION INTRAVENOUS PRN
Status: CANCELLED | OUTPATIENT
Start: 2021-01-01

## 2021-01-01 RX ORDER — CALCIUM GLUCONATE 20 MG/ML
2000 INJECTION, SOLUTION INTRAVENOUS ONCE
Status: COMPLETED | OUTPATIENT
Start: 2021-01-01 | End: 2021-01-01

## 2021-01-01 RX ORDER — CALCIUM CHLORIDE 100 MG/ML
1000 INJECTION INTRAVENOUS; INTRAVENTRICULAR ONCE
Status: COMPLETED | OUTPATIENT
Start: 2021-01-01 | End: 2021-01-01

## 2021-01-01 RX ORDER — CALCIUM GLUCONATE 94 MG/ML
1000 INJECTION, SOLUTION INTRAVENOUS ONCE
Status: DISCONTINUED | OUTPATIENT
Start: 2021-01-01 | End: 2021-01-01 | Stop reason: HOSPADM

## 2021-01-01 RX ORDER — CALCIUM GLUCONATE 94 MG/ML
1000 INJECTION, SOLUTION INTRAVENOUS ONCE
Status: DISCONTINUED | OUTPATIENT
Start: 2021-01-01 | End: 2021-01-01 | Stop reason: SDUPTHER

## 2021-01-01 RX ORDER — NOREPINEPHRINE BIT/0.9 % NACL 16MG/250ML
2-100 INFUSION BOTTLE (ML) INTRAVENOUS CONTINUOUS
Status: DISCONTINUED | OUTPATIENT
Start: 2021-01-01 | End: 2021-01-01 | Stop reason: HOSPADM

## 2021-01-01 RX ADMIN — CALCIUM GLUCONATE 2000 MG: 20 INJECTION, SOLUTION INTRAVENOUS at 15:21

## 2021-01-01 RX ADMIN — Medication 2 MG/HR: at 14:44

## 2021-01-01 RX ADMIN — NALOXONE HYDROCHLORIDE 2 MG: 1 INJECTION PARENTERAL at 14:28

## 2021-01-01 RX ADMIN — Medication 30 MCG/MIN: at 15:04

## 2021-01-01 RX ADMIN — LIDOCAINE HYDROCHLORIDE 2 MG/MIN: 4 INJECTION, SOLUTION INTRAVENOUS at 16:28

## 2021-01-01 RX ADMIN — Medication 100 MCG/MIN: at 18:16

## 2021-01-01 RX ADMIN — VASOPRESSIN 0.04 UNITS/MIN: 20 INJECTION INTRAVENOUS at 17:33

## 2021-01-01 RX ADMIN — INSULIN HUMAN 10 UNITS: 100 INJECTION, SOLUTION PARENTERAL at 14:57

## 2021-01-01 RX ADMIN — SODIUM CHLORIDE 2000 ML: 9 INJECTION, SOLUTION INTRAVENOUS at 14:50

## 2021-01-01 RX ADMIN — CALCIUM CHLORIDE 1000 MG: 100 INJECTION, SOLUTION INTRAVENOUS; INTRAVENTRICULAR at 16:16

## 2021-01-01 RX ADMIN — EPINEPHRINE 5 MCG/MIN: 1 INJECTION PARENTERAL at 17:58

## 2021-01-01 RX ADMIN — SODIUM CHLORIDE 1000 ML: 9 INJECTION, SOLUTION INTRAVENOUS at 15:30

## 2021-01-01 RX ADMIN — DEXTROSE MONOHYDRATE 25 G: 25 INJECTION, SOLUTION INTRAVENOUS at 14:58

## 2021-01-01 RX ADMIN — CALCIUM GLUCONATE 2000 MG: 20 INJECTION, SOLUTION INTRAVENOUS at 16:26

## 2021-01-01 ASSESSMENT — PULMONARY FUNCTION TESTS: PIF_VALUE: 20

## 2021-11-27 PROBLEM — U07.1 COVID-19: Status: ACTIVE | Noted: 2021-01-01

## 2021-11-27 NOTE — ED PROVIDER NOTES
[facility]  ED  Emergency Department Encounter  Emergency Medicine Resident     Pt Name: Piedad Matos  MRN: [id number]  [place] 1940  Date of evaluation: 11/27/21  PCP:  No primary care provider on file. CHIEF COMPLAINT       Chief Complaint   Patient presents with    Loss of Consciousness     unresponsive       HISTORY OFPRESENT ILLNESS  (Location/Symptom, Timing/Onset, Context/Setting, Quality, Duration, Modifying Factors,Severity.)      Piedad Matos is a [de-identified] y. o.yo male who presents with EMS due to being found down. Cording to the report that was given, the neighbor has not heard from patient also called EMS. When EMS came to patient's house, he was found down on the floor. 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 has no past surgical history on file. Social History     Socioeconomic History    Marital status: Single     Spouse name: Not on file    Number of children: Not on file    Years of education: Not on file    Highest education level: Not on file   Occupational History    Not on file   Tobacco Use    Smoking status: Not on file    Smokeless tobacco: Not on file   Substance and Sexual Activity    Alcohol use: Not on file    Drug use: Not on file    Sexual activity: Not on file   Other Topics Concern    Not on file   Social History Narrative    Not on file     Social Determinants of Health     Financial Resource Strain:     Difficulty of Paying Living Expenses: Not on file   Food Insecurity:     Worried About Running Out of Food in the Last Year: Not on file    Prem of Food in the Last Year: Not on file   Transportation Needs:     Lack of Transportation (Medical): Not on file    Lack of Transportation (Non-Medical):  Not on file   Physical Activity:     Days of Exercise per Week: Not on file    Minutes of Exercise per Session: Not on file   Stress:     Feeling of Stress : Not on file   Social Connections:     Frequency of Communication with Friends and Family: Not on file    Frequency of Social Gatherings with Friends and Family: Not on file    Attends Taoism Services: Not on file    Active Member of Clubs or Organizations: Not on file    Attends Club or Organization Meetings: Not on file    Marital Status: Not on file   Intimate Partner Violence:     Fear of Current or Ex-Partner: Not on file    Emotionally Abused: Not on file    Physically Abused: Not on file    Sexually Abused: Not on file   Housing Stability:     Unable to Pay for Housing in the Last Year: Not on file    Number of [place] in the Last Year: Not on file    Unstable Housing in the Last Year: Not on file       No family history on file. Allergies:  Patient has no known allergies. Home Medications:  Prior to Admission medications    Medication Sig Start Date End Date Taking? Authorizing Provider   naproxen (NAPROSYN) 500 MG tablet Take 1 tablet by mouth 2 times daily 7/15/20   Armando Funk MD   albuterol sulfate  (90 Base) MCG/ACT inhaler Inhale 2 puffs into the lungs    Historical Provider, MD   aspirin 81 MG chewable tablet Take 81 mg by mouth 7/23/18   Historical Provider, MD   cetirizine (ZYRTEC) 10 MG tablet Take 1 tablet by mouth 10/3/16   Historical Provider, MD   esomeprazole Magnesium (NEXIUM) 40 MG PACK Take 40 mg by mouth 7/23/18   Historical Provider, MD   traZODone (DESYREL) 100 MG tablet TAKE 1 TABLET BY MOUTH AT BEDTIME 8/10/17   Historical Provider, MD   rosuvastatin (CRESTOR) 5 MG tablet Take 5 mg by mouth 7/10/17   Historical Provider, MD   fluticasone (FLONASE) 50 MCG/ACT nasal spray ADMINISTER 2 SPRAYS INTO EACH NOSTRIL DAILY.  7/10/17   Historical Provider, MD   acetaminophen (TYLENOL) 325 MG tablet Take 1 tablet by mouth every 6 hours as needed for Pain 8/1/18   Yumiko Clark MD   ibuprofen (ADVIL;MOTRIN) 600 MG tablet Take 1 tablet by mouth every 6 hours as needed for Pain 8/1/18   Josep Aparicio MD       REVIEW OFSYSTEMS    (2-9 systems for level 4, 10 or more for level 5)      Review of Systems   Unable to perform ROS: Patient unresponsive       PHYSICAL EXAM   (up to 7 for level 4, 8 or more forlevel 5)      INITIAL VITALS:   ED Triage Vitals   BP Temp Temp Source Pulse Resp SpO2 Height Weight   11/27/21 1435 11/27/21 1517 11/27/21 1517 11/27/21 1427 11/27/21 1427 11/27/21 1516 -- --   (!) 149/106 (!) 91 °F (32.8 °C) CORE 102 (!) 34 99 %         Physical Exam  Constitutional:       Appearance: He is ill-appearing and toxic-appearing. HENT:      Head: Normocephalic and atraumatic. Mouth/Throat:      Mouth: Mucous membranes are dry. Eyes:      Comments: Pupils initially dilated nonresponsive   Pulmonary:      Comments: Patient with agonal respirations  Abdominal:      General: There is no distension. Musculoskeletal:         General: No swelling or tenderness. Skin:     Capillary Refill: Capillary refill takes more than 3 seconds. Findings: No bruising.       Comments: Mottled extremities         DIFFERENTIAL  DIAGNOSIS     PLAN (LABS / IMAGING / EKG):  Orders Placed This Encounter   Procedures    Culture, Blood 1    Culture, Blood 2    Culture, Urine    COVID-19, Rapid    CT Head WO Contrast    XR CHEST PORTABLE    CT CERVICAL SPINE WO CONTRAST    CBC Auto Differential    COMPREHENSIVE METABOLIC PANEL    MAGNESIUM    TOX SCR, BLD, ED    URINALYSIS    Urine Drug Screen    Lipase    Lactate, Sepsis    PREVIOUS SPECIMEN    ELECTROLYTES PLUS    Hemoglobin and hematocrit, blood    CALCIUM, IONIC (POC)    Immature Platelet Fraction    CBC WITH AUTO DIFFERENTIAL    BASIC METABOLIC PANEL    MAGNESIUM    CK    Myoglobin, Serum    Troponin    TSH with Reflex    AMMONIA    BLOOD GAS, ARTERIAL    Immature Platelet Fraction    Brain Natriuretic Peptide    Inpatient consult to Critical Care    Inpatient consult to Cardiology    Venous Blood Gas, POC    Creatinine W/GFR Point of Care    POCT urea (BUN)    Lactic Acid, POC    POCT Glucose    EKG 12 Lead    EKG 12 Lead    EKG 12 Lead    PATIENT STATUS (FROM ED OR OR/PROCEDURAL) Inpatient       MEDICATIONS ORDERED:  Orders Placed This Encounter   Medications    MIDAZOLAM 1 MG/ML IN D5W INFUSION     Terry Zhou: cabinet override    AND Linked Order Group     insulin regular (HUMULIN R;NOVOLIN R) injection 10 Units     dextrose 50 % IV solution    DISCONTD: glucose (GLUTOSE) 40 % oral gel 15 g    DISCONTD: dextrose 50 % IV solution    DISCONTD: glucagon (rDNA) injection 1 mg    DISCONTD: dextrose 5 % solution    DISCONTD: calcium gluconate 10 % injection 1,000 mg    DISCONTD: midazolam (VERSED) 1 mg/mL in D5W infusion    0.9 % sodium chloride bolus    DISCONTD: calcium gluconate 10 % injection 1,000 mg    calcium gluconate 2000 mg in sodium chloride 100 mL    DISCONTD: norepinephrine (LEVOPHED) 16 mg in sodium chloride 0.9 % 250 mL infusion    norepinephrine-sodium chloride (LEVOPHED) 16-0.9 MG/250ML-% infusion     Terry Zhou: cabinet override    DISCONTD: calcium gluconate 2000 mg in sodium chloride 100 mL    calcium gluconate 2000 mg in sodium chloride 100 mL    naloxone (NARCAN) injection 2 mg    0.9 % sodium chloride bolus    calcium chloride 10 % injection 1,000 mg    DISCONTD: lidocaine (CARDIAC INFUSION) 2 g in dextrose 5% 500 mL infusion    DISCONTD: sodium bicarbonate 8.4 % injection     MARILYNN BAIN: cabinet override    DISCONTD: vasopressin 20 Units in dextrose 5 % 100 mL infusion    DISCONTD: EPINEPHrine (EPINEPHrine HCL) 5 mg in dextrose 5 % 250 mL infusion       Initial MDM/Plan: [de-identified] y.o. male who presents with EMS after unresponsiveness.  -When patient arrived, he appeared sick, toxic in appearance.   He was not responsive, agonal respirations.  -Decision was immediately made to intubate patient, with rocuronium and etomidate.  -Patient point-of-care results showed potassium of 8, immediately given calcium and given insulin and dextrose  -Patient was in persistent and wide-complex tachycardia and was cardioverted 2 times  -Due to persisting wide-complex tachycardia, patient was started on lidocaine drip  -Patient was also hypotensive, maxed out on Levophed, vasopressin, epinephrine drip.  -Patient taken to the CT scanner to look for bleed, his CT head was negative for bleed  -Patient eventually started coding towards the end of my shift. ACLS protocol was initiated. Patient had PEA on the monitor  -After rounds of epinephrine and CPR, decision was made to cease CPR.       DIAGNOSTIC RESULTS / EMERGENCYDEPARTMENT COURSE / MDM     LABS:  Labs Reviewed   COVID-19, RAPID - Abnormal; Notable for the following components:       Result Value    SARS-CoV-2, Rapid DETECTED (*)     All other components within normal limits   CBC WITH AUTO DIFFERENTIAL - Abnormal; Notable for the following components:    WBC 20.0 (*)     RBC 6.11 (*)     Hemoglobin 19.6 (*)     Hematocrit 58.3 (*)     NRBC Automated 0.4 (*)     Seg Neutrophils 95 (*)     Lymphocytes 2 (*)     Eosinophils % 0 (*)     Segs Absolute 19.00 (*)     Absolute Lymph # 0.40 (*)     All other components within normal limits   COMPREHENSIVE METABOLIC PANEL - Abnormal; Notable for the following components:    BUN 88 (*)     CREATININE 3.83 (*)     Sodium 147 (*)     Chloride 110 (*)     CO2 9 (*)     Anion Gap 28 (*)     ALT 57 (*)      (*)     Total Bilirubin 3.95 (*)     Albumin 3.1 (*)     Albumin/Globulin Ratio 0.9 (*)     GFR Non- 15 (*)     GFR  19 (*)     All other components within normal limits   MAGNESIUM - Abnormal; Notable for the following components:    Magnesium 2.9 (*)     All other components within normal limits   TOX SCR, BLD, ED - Abnormal; Notable for the following components:    Acetaminophen Level <5 (*)     Salicylate Lvl <1 (*)     All other components within normal limits   LACTATE, SEPSIS - Abnormal; Notable for the following components:    Lactic Acid, Sepsis, Whole Blood 9.3 (*)     All other components within normal limits   LACTATE, SEPSIS - Abnormal; Notable for the following components:    Lactic Acid, Sepsis, Whole Blood 8.5 (*)     All other components within normal limits   ELECTROLYTES PLUS - Abnormal; Notable for the following components:    POC Sodium 148 (*)     POC Potassium 8.0 (*)     POC Chloride 128 (*)     POC TCO2 17 (*)     Anion Gap 4 (*)     All other components within normal limits   HGB/HCT - Abnormal; Notable for the following components:    POC Hemoglobin 20.1 (*)     POC Hematocrit 59 (*)     All other components within normal limits   CALCIUM, IONIC (POC) - Abnormal; Notable for the following components:    POC Ionized Calcium 1.02 (*)     All other components within normal limits   IMMATURE PLATELET FRACTION - Abnormal; Notable for the following components:    Platelet, Immature Fraction 13.7 (*)     Platelet, Fluorescence 55 (*)     All other components within normal limits   CBC WITH AUTO DIFFERENTIAL - Abnormal; Notable for the following components:    WBC 20.6 (*)     Hematocrit 50.4 (*)     NRBC Automated 0.8 (*)     Immature Granulocytes 3 (*)     Seg Neutrophils 88 (*)     Lymphocytes 3 (*)     Eosinophils % 0 (*)     nRBC 1 (*)     Absolute Immature Granulocyte 0.62 (*)     Segs Absolute 18.12 (*)     Absolute Lymph # 0.62 (*)     Absolute Mono # 1.24 (*)     All other components within normal limits   BASIC METABOLIC PANEL - Abnormal; Notable for the following components:    Glucose 138 (*)     BUN 86 (*)     CREATININE 3.90 (*)     Calcium 10.6 (*)     Sodium 149 (*)     Chloride 113 (*)     CO2 13 (*)     Anion Gap 23 (*)     GFR Non- 15 (*)     GFR  18 (*)     All other components within normal limits   MAGNESIUM - Abnormal; Notable for the following components:    Magnesium 3.2 (*)     All other components within normal limits   CK - Abnormal; Notable for the following components:     Total  (*)     All other components within normal limits   MYOGLOBIN, SERUM - Abnormal; Notable for the following components:    Myoglobin 3,010 (*)     All other components within normal limits   TROPONIN - Abnormal; Notable for the following components:    Troponin, High Sensitivity 60 (*)     All other components within normal limits   IMMATURE PLATELET FRACTION - Abnormal; Notable for the following components:    Platelet, Immature Fraction 11.8 (*)     Platelet, Fluorescence 40 (*)     All other components within normal limits   VENOUS BLOOD GAS, POINT OF CARE - Abnormal; Notable for the following components:    pH, [place] 7.073 (*)     pCO2, [place] 51.4 (*)     pO2, [place] 24.2 (*)     HCO3, Venous 15.0 (*)     Negative Base Excess, [place]r 16 (*)     O2 Sat, [place]r 25 (*)     All other components within normal limits   CREATININE W/GFR POINT OF CARE - Abnormal; Notable for the following components:    POC Creatinine 5.08 (*)     GFR Comment 13 (*)     GFR Non- 11 (*)     All other components within normal limits   UREA N (POC) - Abnormal; Notable for the following components:    POC  (*)     All other components within normal limits   LACTIC ACID,POINT OF CARE - Abnormal; Notable for the following components:    POC Lactic Acid 10.45 (*)     All other components within normal limits   POCT GLUCOSE - Abnormal; Notable for the following components:    POC Glucose 115 (*)     All other components within normal limits   CULTURE, BLOOD 1   CULTURE, BLOOD 2   CULTURE, URINE   LIPASE   TSH WITH REFLEX   AMMONIA   URINALYSIS   URINE DRUG SCREEN   PREVIOUS SPECIMEN   BLOOD GAS, ARTERIAL   BRAIN NATRIURETIC PEPTIDE   POCT GLUCOSE   POCT GLUCOSE   POCT GLUCOSE   POCT GLUCOSE   POCT GLUCOSE   POCT GLUCOSE RADIOLOGY:  No results found. EKG      All EKG's are interpreted by the Emergency Department Physicianwho either signs or Co-signs this chart in the absence of a cardiologist.    EMERGENCY DEPARTMENT COURSE:  ED Course as of 11/29/21 1557   Sat Nov 27, 2021   1656 SARS-CoV-2, Rapid(!): DETECTED [AP]      ED Course User Index  [AP] Ole Keen MD          PROCEDURES:  PROCEDURE NOTE - EMERGENCY INTUBATION    PATIENT NAME: Rod [facility] RECORD NO. [id number]  DATE: 11/29/2021  ATTENDING PHYSICIAN: Elysa Koyanagi    PREOPERATIVE DIAGNOSIS:  Acute Respiratory Failure  POSTOPERATIVE DIAGNOSIS:  Same  PROCEDURE PERFORMED:  Emergency endotracheal intubation  PERFORMING PHYSICIAN: Don Burton MD    MEDICATIONS: etomidate intravenously and rocuronium intravenously    DISCUSSION:  Lilly Setting is a [de-identified]y.o.-year-old male who requires intubation and ventilatory support due to comatose state. The history and physical examination were reviewed and confirmed. CONSENT: Unable to be obtained due to patient's condition. PROCEDURE:  A timeout was initiated by the bedside nurse and was confirmed by those present. The patient was placed in the appropriate position. Cricoid pressure was utilized. Intubation was performed by direct laryngoscopy using a laryngoscope and a 7.5 cuffed endotracheal tube. The cuff was then inflated and the tube was secured appropriately at a distance of 26   to the dental ridge. Initial confirmation of placement included bilateral breath sounds, an end tidal CO2 detector and absence of sounds over the stomach. A chest x-ray to verify correct placement of the tube showed appropriate tube position. The patient tolerated the procedure well.      COMPLICATIONS:  None     Don Burton MD  3:57 PM, 11/27/21      PROCEDURE NOTE - CENTRAL VENOUS LINE PLACEMENT    PATIENT NAME: Rod [facility] RECORD NO. [id number]  DATE: 11/29/2021  ATTENDING PHYSICIAN: Dzurik    PREOPERATIVE DIAGNOSIS:  vascular access, poor peripheral access, hypovolemia and centrally administered medications  POSTOPERATIVE DIAGNOSIS:  Same  PROCEDURE PERFORMED:  Left Femoral Vein Central Line Insertion  PERFORMING PHYSICIAN: Ish Brewster MD  ANESTHESIA:  Local utilizing 1% lidocaine  ESTIMATED BLOOD LOSS:  Less than 25 ml  COMPLICATIONS:  None immediately appreciated. DISCUSSION:  Fela Huber is a [de-identified]y.o.-year-old male who requires central IV access vascular access and centrally administered medications. The history and physical examination were reviewed and confirmed. CONSENT: Unable to be obtained due to patient's condition. PROCEDURE:  A timeout was initiated by the bedside nurse and was confirmed by those present. The patient was placed in a supine position. The skin overlying the Left Femoral Vein was prepped with chlorhexadine and draped in sterile fashion. The skin was infiltrated with local anesthetic. The vessel and surrounding anatomy was visualized using ultrasound. Through the anesthetized region, the introducer needle was inserted into the femoral vein returning dark red non pulsatile blood. A guidewire was placed through the center of the needle with no resistance. Ultrasound confirmed presence of wire in the vein. A small incision made in the skin with a #11 scalpel blade. The dilator was inserted into the skin and vein over guidewire using Seldinger technique. The dilator was then removed and the 7F 20cm catheter was placed in the vein over the guidewire using Seldinger technique. The guidewire was then removed and all ports aspirated and flushed appropriately. The catheter then secured using silk suture and a temporary sterile dressing was applied. No immediate complication was evident. All sponge, instrument and needle counts were correct at the completion of the procedure.     Postprocedural chest x-ray showed good position of the catheter with no evidence of hemothorax or pneumothorax. The patient tolerated the procedure well with no immediate complication evident. Olayinka Martinez MD  3:57 PM, 21     CONSULTS:  IP CONSULT TO CRITICAL CARE  IP CONSULT TO CARDIOLOGY    CRITICAL CARE:      FINAL IMPRESSION      1. COVID    2. Non-traumatic rhabdomyolysis    3. Cardiac arrest [facility])          DISPOSITION / PLAN     DISPOSITION  2021 06:45:29 PM      PATIENT REFERRED TO:  No follow-up provider specified.     DISCHARGE MEDICATIONS:  Discharge Medication List as of 2021  9:25 PM          Olayinka Martinez MD  Emergency Medicine Resident    (Please note that portions of this note were completed with a voice recognition program.Efforts were made to edit the dictations but occasionally words are mis-transcribed.)        Olayinka Martinez MD  Resident  21 0189

## 2021-11-27 NOTE — ED NOTES
100 mg SKINNY  given per Dr Abdi Pop verbal order.      Jonathon Alcala RN  11/27/21 [address], RN  11/27/21 5327

## 2021-11-27 NOTE — ED NOTES
Pulse check, PEA on monior cpr resumed     Collette Cox, [address] [facility]  11/27/21 7993

## 2021-11-27 NOTE — ED NOTES
Writer sees on patient monitor, endtidal dropped and art line no longer picking up, entered room, assessed patient. No Pulse noted, ED team and resident/attending called to bedside. CPR started on patient.       Annemarie Weinberg RN  11/27/21 5595

## 2021-11-27 NOTE — ED NOTES
Dr Consuelo Cooper at bedside given 2.5 mg of magnesium sulfate. Pushing medication of 5 min.      Sarthak Joyce RN  11/27/21 7324

## 2021-11-27 NOTE — ED NOTES
[facility] running      Teresa Quiles, [address] [place] [address]  11/27/21 2394

## 2021-11-27 NOTE — ED NOTES
Versed @ 2 mg     Teresa Quiles, RN  11/27/21 [address] [facility], RN  11/27/21 9718

## 2021-11-27 NOTE — ED NOTES
100 of lidocaine given IVP per verba order Dr. Hermelindo Valdez  1 amp of bicarb given IVP per verbal order Dr. Hermelindo Valdez.      Valarie Mcgill, RN  11/27/21 0663

## 2021-11-27 NOTE — ED PROVIDER NOTES
[address] ED  Emergency Department  Emergency Medicine Resident Sign-out     Care of Annette Thomas was assumed from Dr. Radha Townsend and is being seen for Loss of Consciousness (unresponsive)  . The patient's initial evaluation and plan have been discussed with the prior provider who initially evaluated the patient.      EMERGENCY DEPARTMENT COURSE / MEDICAL DECISION MAKING:       MEDICATIONS GIVEN:  Orders Placed This Encounter   Medications    MIDAZOLAM 1 MG/ML IN D5W INFUSION     Terry Zhou: cabinet override    AND Linked Order Group     insulin regular (HUMULIN R;NOVOLIN R) injection 10 Units     dextrose 50 % IV solution    glucose (GLUTOSE) 40 % oral gel 15 g    dextrose 50 % IV solution    glucagon (rDNA) injection 1 mg    dextrose 5 % solution    DISCONTD: calcium gluconate 10 % injection 1,000 mg    midazolam (VERSED) 1 mg/mL in D5W infusion    0.9 % sodium chloride bolus    calcium gluconate 10 % injection 1,000 mg    calcium gluconate 2000 mg in sodium chloride 100 mL    norepinephrine (LEVOPHED) 16 mg in sodium chloride 0.9 % 250 mL infusion    norepinephrine-sodium chloride (LEVOPHED) 16-0.9 MG/250ML-% infusion     Terry Zhou: cabinet override    DISCONTD: calcium gluconate 2000 mg in sodium chloride 100 mL    calcium gluconate 2000 mg in sodium chloride 100 mL    naloxone (NARCAN) injection 2 mg    0.9 % sodium chloride bolus    calcium chloride 10 % injection 1,000 mg    lidocaine (CARDIAC INFUSION) 2 g in dextrose 5% 500 mL infusion    sodium bicarbonate 8.4 % injection     Hildegarde Jovana: cabinet override    vasopressin 20 Units in dextrose 5 % 100 mL infusion    EPINEPHrine (EPINEPHrine HCL) 5 mg in dextrose 5 % 250 mL infusion       LABS / RADIOLOGY:     Labs Reviewed   COVID-19, RAPID - Abnormal; Notable for the following components:       Result Value    SARS-CoV-2, Rapid DETECTED (*)     All other components within normal limits   CBC WITH AUTO DIFFERENTIAL - Abnormal; Notable for the following components:    WBC 20.0 (*)     RBC 6.11 (*)     Hemoglobin 19.6 (*)     Hematocrit 58.3 (*)     NRBC Automated 0.4 (*)     Seg Neutrophils 95 (*)     Lymphocytes 2 (*)     Eosinophils % 0 (*)     Segs Absolute 19.00 (*)     Absolute Lymph # 0.40 (*)     All other components within normal limits   COMPREHENSIVE METABOLIC PANEL - Abnormal; Notable for the following components:    BUN 88 (*)     CREATININE 3.83 (*)     Sodium 147 (*)     Chloride 110 (*)     CO2 9 (*)     Anion Gap 28 (*)     ALT 57 (*)      (*)     Total Bilirubin 3.95 (*)     Albumin 3.1 (*)     Albumin/Globulin Ratio 0.9 (*)     GFR Non- 15 (*)     GFR  19 (*)     All other components within normal limits   MAGNESIUM - Abnormal; Notable for the following components:    Magnesium 2.9 (*)     All other components within normal limits   TOX SCR, BLD, ED - Abnormal; Notable for the following components:    Acetaminophen Level <5 (*)     Salicylate Lvl <1 (*)     All other components within normal limits   LACTATE, SEPSIS - Abnormal; Notable for the following components:    Lactic Acid, Sepsis, Whole Blood 9.3 (*)     All other components within normal limits   LACTATE, SEPSIS - Abnormal; Notable for the following components:    Lactic Acid, Sepsis, Whole Blood 8.5 (*)     All other components within normal limits   ELECTROLYTES PLUS - Abnormal; Notable for the following components:    POC Sodium 148 (*)     POC Potassium 8.0 (*)     POC Chloride 128 (*)     POC TCO2 17 (*)     Anion Gap 4 (*)     All other components within normal limits   HGB/HCT - Abnormal; Notable for the following components:    POC Hemoglobin 20.1 (*)     POC Hematocrit 59 (*)     All other components within normal limits   CALCIUM, IONIC (POC) - Abnormal; Notable for the following components:    POC Ionized Calcium 1.02 (*)     All other components within normal limits   IMMATURE PLATELET FRACTION - Abnormal; Notable for the following components:    Platelet, Immature Fraction 13.7 (*)     Platelet, Fluorescence 55 (*)     All other components within normal limits   CBC WITH AUTO DIFFERENTIAL - Abnormal; Notable for the following components:    WBC 20.6 (*)     Hematocrit 50.4 (*)     NRBC Automated 0.8 (*)     All other components within normal limits   CK - Abnormal; Notable for the following components:     Total  (*)     All other components within normal limits   MYOGLOBIN, SERUM - Abnormal; Notable for the following components:    Myoglobin 3,010 (*)     All other components within normal limits   TROPONIN - Abnormal; Notable for the following components:    Troponin, High Sensitivity 60 (*)     All other components within normal limits   IMMATURE PLATELET FRACTION - Abnormal; Notable for the following components:    Platelet, Immature Fraction 11.8 (*)     Platelet, Fluorescence 40 (*)     All other components within normal limits   VENOUS BLOOD GAS, POINT OF CARE - Abnormal; Notable for the following components:    pH, Oleksandr 7.073 (*)     pCO2, Ole[place]r 51.4 (*)     pO2, Ole[place]r 24.2 (*)     HCO3, Venous 15.0 (*)     Negative Base Excess, Oleksandr 16 (*)     O2 Sat, Oleks[place]r 25 (*)     All other components within normal limits   CREATININE W/GFR POINT OF CARE - Abnormal; Notable for the following components:    POC Creatinine 5.08 (*)     GFR Comment 13 (*)     GFR Non- 11 (*)     All other components within normal limits   UREA N (POC) - Abnormal; Notable for the following components:    POC  (*)     All other components within normal limits   LACTIC ACID,POINT OF CARE - Abnormal; Notable for the following components:    POC Lactic Acid 10.45 (*)     All other components within normal limits   POCT GLUCOSE - Abnormal; Notable for the following components:    POC Glucose 115 (*)     All other components within normal limits   CULTURE, BLOOD 1   CULTURE, BLOOD 2   CULTURE, URINE LIPASE   TSH WITH REFLEX   AMMONIA   BLOOD GAS, ARTERIAL   URINALYSIS   URINE DRUG SCREEN   PROTIME-INR   APTT   PREVIOUS SPECIMEN   BASIC METABOLIC PANEL   MAGNESIUM   BLOOD GAS, ARTERIAL   TROPONIN   TROPONIN   TROPONIN   BRAIN NATRIURETIC PEPTIDE   POCT GLUCOSE   POCT GLUCOSE   POCT GLUCOSE   POCT GLUCOSE   POCT GLUCOSE   POCT GLUCOSE       CT Head WO Contrast    Result Date: 11/27/2021  EXAMINATION: CT OF THE HEAD WITHOUT CONTRAST  11/27/2021 5:02 pm TECHNIQUE: CT of the head was performed without the administration of intravenous contrast. Dose modulation, iterative reconstruction, and/or weight based adjustment of the mA/kV was utilized to reduce the radiation dose to as low as reasonably achievable. COMPARISON: None. HISTORY: ORDERING SYSTEM PROVIDED HISTORY: unresponsive with blown pupils TECHNOLOGIST PROVIDED HISTORY: unresponsive with blown pupils Decision Support Exception - unselect if not a suspected or confirmed emergency medical condition->Emergency Medical Condition (MA) Reason for Exam: unresponsive with blown pupils Acuity: Unknown Type of Exam: Unknown FINDINGS: BRAIN/VENTRICLES: There is no acute intracranial hemorrhage, mass effect or midline shift. No abnormal extra-axial fluid collection. The gray-white differentiation is maintained without evidence of an acute infarct. There is prominence of the ventricles and sulci due to global parenchymal volume loss. There are nonspecific areas of hypoattenuation within the periventricular and subcortical white matter, which likely represent chronic microvascular ischemic change. ORBITS: The visualized portion of the orbits demonstrate no acute abnormality. SINUSES: Air-fluid level left maxillary sinus and left frontal sinus. SOFT TISSUES/SKULL: No acute abnormality of the visualized skull or soft tissues.      No acute disease     CT CERVICAL SPINE WO CONTRAST    Result Date: 11/27/2021  EXAMINATION: CT OF THE CERVICAL SPINE WITHOUT CONTRAST 11/27/2021 5:02 pm TECHNIQUE: CT of the cervical spine was performed without the administration of intravenous contrast. Multiplanar reformatted images are provided for review. Dose modulation, iterative reconstruction, and/or weight based adjustment of the mA/kV was utilized to reduce the radiation dose to as low as reasonably achievable. COMPARISON: None. HISTORY: ORDERING SYSTEM PROVIDED HISTORY: unresponsive TECHNOLOGIST PROVIDED HISTORY: unresponsive Decision Support Exception - unselect if not a suspected or confirmed emergency medical condition->Emergency Medical Condition (MA) Reason for Exam: unresponsive with blown pupils Acuity: Unknown Type of Exam: Unknown FINDINGS: BONES/ALIGNMENT: There is no acute fracture or traumatic malalignment except mild scoliosis. DEGENERATIVE CHANGES: No significant degenerative changes. SOFT TISSUES: There is no prevertebral soft tissue swelling. Right pleural effusion or airspace disease. ET and enteric tube noted in the appropriate lumens. No acute abnormality of the cervical spine. Right pleural effusion versus airspace disease. XR CHEST PORTABLE    Result Date: 11/27/2021  EXAMINATION: ONE XRAY VIEW OF THE CHEST 11/27/2021 4:22 pm COMPARISON: None. HISTORY: ORDERING SYSTEM PROVIDED HISTORY: unresponsive, intubated TECHNOLOGIST PROVIDED HISTORY: unresponsive, intubated FINDINGS: Endotracheal tube has tip approximately 2 cm proximal to the sudarshan. Nasogastric tube extends below the diaphragm. Pacemaker in the left chest wall. Right apical pleuroparenchymal thickening. Linear artifact is seen at the left apex. Prominence of pulmonary vascularity. Blunting of the costophrenic angles. No gross pneumothorax. Cardiac and mediastinal silhouettes are within normal limits. Additional catheter projects over the left chest, extending to the right neck. Pulmonary vascular congestion. Trace pleural effusions or pleural thickening.        RECENT VITALS:     Temp: (!) 91 °F (32.8 °C),  Pulse: 67, Resp: 23, BP: (!) 58/36, SpO2: 96 %      This patient is a [de-identified] y.o. Male with found down, unknown downtime. Guppy breathing on initial EMS arrival.  On arrival, patient was hypothermic, hypotensive, patient was intubated on arrival.  Point-of-care potassium 8, hyperkalemia protocol initiated. Lab potassium 5.3. Intermittent V. tach sustained, required cardioversion x2. Levophed, vasopressin, epinephrine, lidocaine. Cardiology aware. CT head negative for bleed. No antibiotics given. Leukocytosis 20. Lactic acid 8. CK and myoglobin pending. Covid positive. Full code. Admitted to critical care for further care and evaluation. Covid  Rhabdo  FERNANDO Cr 3.5    ED Course as of 11/27/21 1836   Sat Nov 27, 2021   1656 SARS-CoV-2, Rapid(!): DETECTED [AP]      ED Course User Index  [AP] Sherine Low MD       OUTSTANDING TASKS / RECOMMENDATIONS:    1. Admission     FINAL IMPRESSION:     1. COVID    2. Non-traumatic rhabdomyolysis        DISPOSITION:         DISPOSITION:  []  Discharge   []  Transfer -    [x]  Admission - MICU    []  Against Medical Advice   []  Eloped   FOLLOW-UP: No follow-up provider specified.    DISCHARGE MEDICATIONS: New Prescriptions    No medications on file          E Cong Leon MD  Emergency Medicine Resident  [facility]      Finn Sommer MD  Resident  11/27/21 6837

## 2021-11-27 NOTE — ED NOTES
Pulse check PEA on monitor, cpr resumed, 1 mg of EPI given     Sarthak Joyce RN  11/27/21 82 Nori Gleason, BESSY  11/27/21 1940

## 2021-11-27 NOTE — ED PROVIDER NOTES
[facility] ED  Emergency Department  Senior Resident Attestation     Primary Care Physician  No primary care provider on file. I performed a history and physical examination of the patient and discussed management with the bahman resident. I reviewed the bahman residents note and agree with the documented findings and plan of care. Any areas of disagreement are noted on the chart. Case was then discussed with Faculty Attending Supervisor for additional medical management. PERTINENT ATTENDING PHYSICIAN COMMENTS:    HISTORY:   Martine George is a [de-identified] y.o. male who  has no past medical history on file. and presents with complaint of unresponsiveness. Neighbor noticed the patient was unresponsive, called EMS. Unknown downtime. Patient brought in, not conscious but breathing on his own but not well. Initial O2 saturation 27% with a good waveform. Somewhat responsive to painful stimuli but not consistently. Patient required emergent intubation and vascular access. Unstable vital signs, sometimes bradycardic, sometimes tachycardic, some is being paced, hypotensive as well. PHYSICAL:   Temp: (!) 91 °F (32.8 °C),  Pulse: 110, Resp: 16, BP: 101/62, SpO2: 96 %  Gen: Toxic and ill-appearing  Neck: Supple  Cards: Irregular rate and rhythms  Pulm: Diffuse rhonchi bilaterally  Abdomen: Soft,non-distended  Skin: Cyanotic, pale, dry, cap refill 5+ seconds  Extremities: Unable to palpate DP or PT pulses. Cyanosis to the hands and lower legs bilaterally    IMPRESSION and PLAN  Toxic appearing, hypoxic, hypotensive, incredibly variable cardiac rhythms. Patient emergently intubated by Bahman resident on arrival utilizing rocuronium and etomidate. Difficulty obtaining IV access therefore I placed a right tibia IO. Excellent aspiration, flushed with saline. Aggressive fluid resuscitation started 3 L of saline given via pressure bag system to the IO.     Point-of-care labs indicative of hyperkalemia. Given patient's variable cardiac rhythms, will assume the lab is accurate. Patient was given calcium gluconate, insulin, D50. Patient was also given bicarb. After a central line was established, patient was still having considerably variable cardiac rhythms and therefore 1 g of calcium chloride was given via the central line. This was followed by another amp of bicarb. Patient began to go in and out of V. tach with a pulse. Patient was initially given 100 mg of IV lidocaine. Eventually patient was going in and out of V. tach with a pulse. However patient's blood pressure began to drop so I cardioverted the patient with 200 J which was unsuccessful. Patient was then cardioverted again at 360J which was also unsuccessful. Patient's blood pressure however began to stabilize. Patient was started on lidocaine infusion. Pressor support with Levophed. Most of the lab work appears hemoconcentrated, will replete labs. Covid positive. High concern for rhabdo. Dominguez catheter placed, no urine output noted. Patient became more hypotensive, maxed out Levophed, shock dose vasopressin. Epinephrine added on and required higher and higher doses. Eventually however patient's end-tidal CO2 began to fall. Soon after, patient went into cardiac arrest.    Resuscitative efforts started. Please see nursing documentation for code documentation. Ultimately however, after multiple rounds of CPR, patient remained in PEA, no palpable pulse. Time of death 1842, 11/27/2021.       ED Course as of 11/27/21 1714   Sat Nov 27, 2021   1656 SARS-CoV-2, Rapid(!): DETECTED [AP]      ED Course User Index  [AP] Sherine Low MD     PROCEDURE NOTE - ARTERIAL LINE PLACEMENT    PATIENT NAME: [address] [place] [address] RECORD NO. [id number]  DATE: 11/27/2021  ATTENDING PHYSICIAN:  Dr. Fadi Bryan DIAGNOSIS:  Need for blood pressure monitoring, frequent lab draws  POSTOPERATIVE DIAGNOSIS: Same  PROCEDURE PERFORMED: Right Femoral Arterial Line Insertion  PERFORMING PHYSICIAN:  Neto Belle MD  ESTIMATED BLOOD LOSS:  Less than 5 ml  COMPLICATIONS:  None immediately appreciated. DISCUSSION:  Ileana Mendoza is a [de-identified] y.o. male who requires invasive pressure monitoring. The history and physical examination were reviewed and confirmed. CONSENT: Unable to be obtained due to patient's condition. PROCEDURE:  A timeout was initiated by the bedside nurse and was confirmed by those present. The patient was placed in a supine position. The skin overlying the Left Femoral was prepped with chlorhexadine. Through this region and under ultrasound guidance, the introducer needle through catheter was inserted until pulsatile bright blood was seen in collection tubing. Guidewire was advanced with no resistance. Catheter was advanced into the artery, wire was pulled with brisk bleeding noted. Pressure monitoring setup was connected to the catheter, it aspirated and flushed easily. The catheter was secured to the wrist with 3-0 silk. No immediate complication was evident. All sponge, instrument and needle counts were correct at the completion of the procedure. Neto Belle MD  6:07 PM, 11/27/21      CRITICAL CARE: There was a high probability of clinically significant/life threatening deterioration in this patient's condition which required my urgent intervention. Total critical care time was 120 minutes. This excludes any time for separately reportable procedures.      Neto Belle MD  Senior Resident Physician    (Please note that portions of this note were completed with a voice recognition program.  Efforts were made to edit the dictations but occasionally words are mis-transcribed.)        Neto Belle MD  11/27/21 5865

## 2021-11-27 NOTE — PROGRESS NOTES
Charting intubations and reintubations    ETT Size (e.g. 7.5) 7.5  ETT Placement (e.g. 23 cm at lips) 25 CM AT GUMS  ETT secured with (commercial device name) KANDY    Tube placement verified by:  Auscultation (yes/no) YES  Positive color change on end tidal CO2 detector (yes/no) YES  CXR (yes/no) YES    Reason for intubation (jot a quick note/phrase e.g. Impending respiratory failure) UMRESPONSIVE, IMPENDING RESP FAILURE        -- Notify charge therapist regarding all intubations, extubations, reintubations and self extubations    GODWIN PICKERING RCP  2:59 PM

## 2021-11-27 NOTE — ED NOTES
Pt present to the ED with C/O unresponsiveness. Per ems the neighbor called EMS he was found down in his appartment. The neighbor was unsure of how long he was down. Pt's head was turned to the right and he was unresponsive. Pt's hands and legs are blue and cold. Pt on full cardiac monitor. Life remy applied. Pt  Is at 27 % on non rebreather.         Bridget Liriano RN  11/27/21 1428       Bridget Liriano RN  11/27/21 1431       Bridget Liriano RN  11/27/21 1435

## 2021-11-27 NOTE — ED NOTES
Pt is having short runs ot vtachs. Resident at bedside.      Jose L Hart RN  11/27/21 Kiara Chavis RN  11/27/21 5021

## 2021-11-27 NOTE — ED PROVIDER NOTES
[facility]     Emergency Department     Faculty Attestation    I performed a history and physical examination of the patient and discussed management with the resident. I reviewed the residents note and agree with the documented findings and plan of care. Any areas of disagreement are noted on the chart. I was personally present for the key portions of any procedures. I have documented in the chart those procedures where I was not present during the key portions. I have reviewed the emergency nurses triage note. I agree with the chief complaint, past medical history, past surgical history, allergies, medications, social and family history as documented unless otherwise noted below. For Physician Assistant/ Nurse Practitioner cases/documentation I have personally evaluated this patient and have completed at least one if not all key elements of the E/M (history, physical exam, and MDM). Additional findings are as noted. I have personally seen and evaluated the patient. I find the patient's history and physical exam are consistent with the NP/PA documentation. I agree with the care provided, treatment rendered, disposition and follow-up plan. Arrives unresponsive found in his apartment unknown downtime patient is tachypneic completely unresponsive pupils are dilated bilaterally there is no external signs of trauma however his in his lower extremities there is signs of cyanosis in the upper and lower extremities and at the knees. Immediate interventions included airway control with intubation the patient was also noted be profoundly asked Synotic and hyperkalemic with high lactic acids further work-up in progress Dr. Yuriy Cruz to hand over care at this time. Critical Care     Aurelia Davis M.D.   Attending Emergency  Physician              Juan Corona MD  11/27/21 4004

## 2021-11-27 NOTE — ED NOTES
Patient cardioverted by Dr. Verna Argueta at [facility] for [address] [place] [address].       Valarie Mcgill RN  11/27/21 8655

## 2021-11-27 NOTE — ED NOTES
PEA on monitor, pt on lei CPR resumed     Sarthak Joyce, RN  11/27/21 Rhea Taylor, RN  11/27/21 9767

## 2021-11-27 NOTE — ED NOTES
Ed resident, dr. Zeus Pratt at bedside, aware of patients ongoing BP being hypotensive. Minimal urine output in hendrickson tube, unable to obtain urine sample.       Sarah Purcell RN  11/27/21 2284

## 2021-11-27 NOTE — ED NOTES
Starting Levo @ 30 mcg per verbal order from Dr. Flores Vazquez.      Rashid Garcia, RN  11/27/21 [address]  [address], RN  11/27/21 4088

## 2021-11-27 NOTE — Clinical Note
Patient Class: Inpatient [101]   REQUIRED: Diagnosis: [id number] [[id number]]   Estimated Length of Stay: Estimated stay of more than 2 midnights   Admitting Provider: Harris Cleveland [[id number]]

## 2021-11-27 NOTE — ED PROVIDER NOTES
[address] [facility]  Emergency Department  Faculty Sign-Out Addendum     Care of Karan Bear was assumed from previous attending and is being seen for Loss of Consciousness (unresponsive)  . The patient's initial evaluation and plan have been discussed with the prior provider who initially evaluated the patient. Handoff taken on the following patient from prior Attending Physician:    Monica Villa    I was available and discussed any additional care issues that arose and coordinated the management plans with the resident(s) caring for the patient during my duty period. Any areas of disagreement with residents documentation of care or procedures are noted on the chart. I was personally present for the key portions of any/all procedures during my duty period. I have documented in the chart those procedures where I was not present during the key portions.       EMERGENCY DEPARTMENT COURSE / MEDICAL DECISION MAKING:       MEDICATIONS GIVEN:  Orders Placed This Encounter   Medications    MIDAZOLAM 1 MG/ML IN D5W INFUSION     Terry Zhou: cabinet override    AND Linked Order Group     insulin regular (HUMULIN R;NOVOLIN R) injection 10 Units     dextrose 50 % IV solution    glucose (GLUTOSE) 40 % oral gel 15 g    dextrose 50 % IV solution    glucagon (rDNA) injection 1 mg    dextrose 5 % solution    DISCONTD: calcium gluconate 10 % injection 1,000 mg    midazolam (VERSED) 1 mg/mL in D5W infusion    0.9 % sodium chloride bolus    calcium gluconate 10 % injection 1,000 mg    calcium gluconate 2000 mg in sodium chloride 100 mL    norepinephrine (LEVOPHED) 16 mg in sodium chloride 0.9 % 250 mL infusion    norepinephrine-sodium chloride (LEVOPHED) 16-0.9 MG/250ML-% infusion     Terry Zhou: cabinet override       LABS / RADIOLOGY:     Labs Reviewed   CBC WITH AUTO DIFFERENTIAL - Abnormal; Notable for the following components:       Result Value    WBC 20.0 (*)     RBC 6.11 (*) Hemoglobin 19.6 (*)     Hematocrit 58.3 (*)     NRBC Automated 0.4 (*)     All other components within normal limits   LACTATE, SEPSIS - Abnormal; Notable for the following components:    Lactic Acid, Sepsis, Whole Blood 9.3 (*)     All other components within normal limits   ELECTROLYTES PLUS - Abnormal; Notable for the following components:    POC Sodium 148 (*)     POC Potassium 8.0 (*)     POC Chloride 128 (*)     POC TCO2 17 (*)     Anion Gap 4 (*)     All other components within normal limits   HGB/HCT - Abnormal; Notable for the following components:    POC Hemoglobin 20.1 (*)     POC Hematocrit 59 (*)     All other components within normal limits   CALCIUM, IONIC (POC) - Abnormal; Notable for the following components:    POC Ionized Calcium 1.02 (*)     All other components within normal limits   IMMATURE PLATELET FRACTION - Abnormal; Notable for the following components:    Platelet, Immature Fraction 13.7 (*)     Platelet, Fluorescence 55 (*)     All other components within normal limits   VENOUS BLOOD GAS, POINT OF CARE - Abnormal; Notable for the following components:    pH, [place] 7.073 (*)     pCO2, [place] 51.4 (*)     pO2, [place] 24.2 (*)     HCO3, Venous 15.0 (*)     Negative Base Excess, [place] 16 (*)     O2 Sat, [place] 25 (*)     All other components within normal limits   CREATININE W/GFR POINT OF CARE - Abnormal; Notable for the following components:    POC Creatinine 5.08 (*)     GFR Comment 13 (*)     GFR Non- 11 (*)     All other components within normal limits   UREA N (POC) - Abnormal; Notable for the following components:    POC  (*)     All other components within normal limits   LACTIC ACID,POINT OF CARE - Abnormal; Notable for the following components:    POC Lactic Acid 10.45 (*)     All other components within normal limits   POCT GLUCOSE - Abnormal; Notable for the following components:    POC Glucose 115 (*)     All other components within normal limits   CULTURE, BLOOD 1 CULTURE, BLOOD 2   CULTURE, URINE   COVID-19, RAPID   COMPREHENSIVE METABOLIC PANEL   MAGNESIUM   BLOOD GAS, ARTERIAL   TOX SCR, BLD, ED   URINALYSIS   URINE DRUG SCREEN   LIPASE   LACTATE, SEPSIS   PROTIME-INR   APTT   PREVIOUS SPECIMEN   POCT GLUCOSE   POCT GLUCOSE   POCT GLUCOSE   POCT GLUCOSE   POCT GLUCOSE   POCT GLUCOSE       No results found. RECENT VITALS:     Temp: (!) 91 °F (32.8 °C),  Pulse: 71, Resp: 16, BP: (!) 115/41, SpO2: 100 %    This patient is a [de-identified] y.o. Male with unresponsiveness. Found down unclear on downtime hypotensive hypothermic. Currently on max dose Levophed. Intubated. Will require central line and arterial line placement. Aggressive volume resuscitation. Admit to ICU    There was significant risk of life threatening deterioration of patient's condition requiring my direct management. Critical care time 64 minutes, excluding any documented procedures. 6:20 PM EST update on patient. Patient is required progressively increased pressors including significant levels of Levophed, addition of vasopressin, initiation of epinephrine. A left femoral central line has been placed a right arterial femoral line has been placed. We did have an unsuccessful attempt in the right side for both the arterial line and the femoral line with no obvious evidence of hematoma. Has intermittently had episodes of ventricular tachycardia, have attempted electrical synchronized cardioversion twice at 203 60 without improvement. Also has been started on lidocaine infusion. Awaiting bed in the ICU at this time however prognosis is quite guarded despite maximal resuscitative efforts. 6:45 PM EST patient went into PEA arrest, ACLS efforts were initiated using [place] device, 2 rounds of epinephrine were given. Bedside ultrasound showed some minimal cardiac activity but no concerted concentric squeeze. End-tidal unmeasurable.   In context of multiorgan failure, Covid infection, maximal pressor doses prior to arrest and appropriate ACLS interventions decision was made to terminate efforts due to futility. Time of death was declared at [address] PM.   to be notified. 7:35 PM: Spoke with Tao Clark with . Not a  case. Spoke with Dr. Florencia Carroll, on call for PCP Dr. Farzana Jenkins. Death certificate can be sent to her office. They unfortunately do not have any next of kin contact info available. OUTSTANDING TASKS / RECOMMENDATIONS:    1. Warm resuscitation  2. Follow-up diagnostic studies  3.  Central line and arterial line placement      Jose Mora MD, [facility]  Attending Emergency Physician  [address] ED       Kailey White MD  11/27/21 1538       Kailey White MD  11/27/21 Aubrey White MD  11/27/21 7360       Kailey White MD  11/27/21 1949

## 2021-11-27 NOTE — PROGRESS NOTES
Patient presented to the ER after being found down, found to be Covid positive, requiring significant medical management, ICU was consulted, patient was evaluated, admitted to the ICU service. Shortly after, patient coded in the emergency department, code was run by the emergency department, patient . Please refer to emergency department notes regarding code.       Toi Hyman MD  Emergency Medicine Resident  Critical care service

## 2021-11-27 NOTE — ED NOTES
Pulse check PEA on monitor, CPR resumed   Victorino Perkins, RN  11/27/21 [address] [facility], RN  11/27/21 3325

## 2021-11-27 NOTE — ED NOTES
Bed: 14  Expected date:   Expected time:   Means of arrival:   Comments:  [address] RN  11/27/21 3229

## 2021-11-28 NOTE — FLOWSHEET NOTE
[facility]   Patient Death Note  DEATH   Shift date: 2021    Shift day: Saturday  Shift #: 2                 Room #    Name: Annette Thomas            Age: [de-identified] y.o. Gender: male          Taoist: None      Place of Restoration:   Admit Date & Time: 2021  2:23 PM     Referral: nurse    Actual date of death: 2021   TOD: 1842       SITUATION AT DEATH:  Patient arrived via EMS after being found down for an indefinite period of time. Much care was given and m any interventions were applied. Eventually interventions were stopped as the patieint situation did not improve and the situation was assessed as futile. .     IS THIS A 'S CASE? No    SPIRITUAL/EMOTIONAL INTERVENTION:  This  did not have any contact with patient or family. No family is known for patient. No family made contact with [facility]. Neighbor who called for EMS after noticing no activity for several days said no one lived with or visited patient. Family Received Grief Packet? No       NAME AND PHONE NUMBER OF DOCTOR SIGNING DEATH CERTIFICATE:  (full name) Dr. Elian Hancock     (phone)      Note in chart:  No attending physician on file; No PCP on file     found no family to contact about  home. No information has been found to seek a signature for release of body.  are now contacting the  home after a patient death.  spoke to/left message for  ( home) indicating family's choice for their services.  called  home on  (date) at  (time). Copy of COMPLETED Release of Body Form Received? No    Patient's belongings: None observed     HOME:  Name:   City:   Phone Number:     NEXT OF KIN:  Name:   Relationship:   Street Address:   City:   State:   Zip code:    Phone Number:     [facility]? Yes    IF SO, WHAT?   Investigate location of next of kin    Electronically signed by denita Martinez 11/27/2021 at 9:44 PM.  [address]  [phone]                 11/27/21 1842   Encounter Summary   Services provided to: Patient not available   Referral/Consult From: Nursing Supervisor/Manager   Crisis   Type Code   Grief and Life Adjustment   Type Death

## 2021-11-28 NOTE — ED NOTES
Spoke with [facility]. Nya Tijerina referral number [id number].      Isabella Horner, RN  11/27/21 [address] RN  11/27/21 0054

## 2021-11-29 LAB
EKG ATRIAL RATE: 119 BPM
EKG ATRIAL RATE: 129 BPM
EKG ATRIAL RATE: 60 BPM
EKG Q-T INTERVAL: 362 MS
EKG Q-T INTERVAL: 424 MS
EKG Q-T INTERVAL: 440 MS
EKG QRS DURATION: 150 MS
EKG QRS DURATION: 182 MS
EKG QRS DURATION: 44 MS
EKG QTC CALCULATION (BAZETT): 370 MS
EKG QTC CALCULATION (BAZETT): 424 MS
EKG QTC CALCULATION (BAZETT): 581 MS
EKG R AXIS: -48 DEGREES
EKG R AXIS: -86 DEGREES
EKG R AXIS: 73 DEGREES
EKG T AXIS: -101 DEGREES
EKG T AXIS: -83 DEGREES
EKG T AXIS: 83 DEGREES
EKG VENTRICULAR RATE: 105 BPM
EKG VENTRICULAR RATE: 60 BPM
EKG VENTRICULAR RATE: 63 BPM

## 2021-11-30 NOTE — PROGRESS NOTES
Physician Progress Note      PATIENT:               Hazel Jacinto  CSN #:                  [id number]  :                       1940  ADMIT DATE:       2021 2:23 PM  Natalie Gates DATE:        2021 9:22 PM  RESPONDING  PROVIDER #:        Brenda Saenz MD          QUERY TEXT:    Pt admitted with COVID-19 positive, hypothermic and hypotensive  Pt noted to   have intubation on arrival to ER. If possible, please document in the progress notes and discharge summary if   you are evaluating and/or treating any of the following: The medical record reflects the following:  Risk Factors: [de-identified] yr old found unresponsive  Clinical Indicators: per EMS \"guppy breathing', Intubated on arrival, VBG pH   7.073 Pco2 51.4 Po2 reema 24.2 Hco3 15 be -16, COVID detected  Treatment: Intubation with mechanical ventilator support, CXR, VBG    Thank you, please contact me for any questions! Adam Mac RN CDI Supervisor   [phone]  Options provided:  -- Acute respiratory failure with hypoxia  -- Acute respiratory failure with hypercapnia  -- Acute respiratory failure with hypoxia and hypercapnia  -- intubated for airway protection without acute respiratory failure  -- Other - I will add my own diagnosis  -- Disagree - Not applicable / Not valid  -- Disagree - Clinically unable to determine / Unknown  -- Refer to Clinical Documentation Reviewer    PROVIDER RESPONSE TEXT:    This patient is in acute respiratory failure with hypoxia. Query created by: Stefanie Peres on 2021 10:57 AM      QUERY TEXT:    Pt admitted with COVID 19 positive, found unresponsive. Pt noted to have   hypotension requiring vasopressors. If possible, please document in the   progress notes and discharge summary if you are evaluating and/or treating any   of the following:     The medical record reflects the following:  Risk Factors: [de-identified] yr old COIVD +, found unresponsive  Clinical Indicators: Troponins 60, wbc 20, lactic acid 9.3 Cr 3.5, BP 58/36  Treatment: Levophed, Vasopressin, epinephrine, lidocaine drips with titration,   Cardiology consult, lab monitoring    Thank you, please contact me for any questions! Bert García RN CDI Supervisor   [phone]  Options provided:  -- Cardiogenic Shock  -- Septic Shock  -- Hypovolemic Shock  -- Other - I will add my own diagnosis  -- Disagree - Not applicable / Not valid  -- Disagree - Clinically unable to determine / Unknown  -- Refer to Clinical Documentation Reviewer    PROVIDER RESPONSE TEXT:    This patient is in septic shock. Query created by: Cy Moctezuma on 11/29/2021 11:01 AM      QUERY TEXT:    Pt admitted with COVID-19 and noted to have lactic acidosis and elevated WBC. If possible, please document in progress notes and discharge summary if you   are evaluating and/or treating: The medical record reflects the following:  Risk Factors: [de-identified] yr old found unresponsive, COVID 19 positive  Clinical Indicators: Hypothermic Temp 32.8 C, bp 58/36, Lactic acid 9.3, WBC   20, Cr 3.5, cyanosis upper and lower extremities, CXR cxr Pulmonary vascular   congestion. Trace pleural effusions or pleural thickening. Treatment: IVF X3L,  Vasopressors Levophed ,Vasopressin, Epinephrine gtts, lab   monitoring, pending ICU admission prior to expiring      Thank you, please contact me for any questions! Bert García RN CDI Supervisor   [phone]  Options provided:  -- Sepsis present on admission due to COVID-19 infection  -- Covid-19 infection without sepsis  -- Other - I will add my own diagnosis  -- Disagree - Not applicable / Not valid  -- Disagree - Clinically unable to determine / Unknown  -- Refer to Clinical Documentation Reviewer    PROVIDER RESPONSE TEXT:    This patient has sepsis which was present on admission due to COVID-19   infection.     Query created by: Cy Moctezuma on 11/29/2021 11:07 AM      Electronically signed by:  Med Palacios MD 11/30/2021 10:12 AM

## 2021-12-02 LAB
CULTURE: NORMAL
Lab: NORMAL
SPECIMEN DESCRIPTION: NORMAL

## 2021-12-03 LAB
CULTURE: NORMAL
Lab: NORMAL
SPECIMEN DESCRIPTION: NORMAL